# Patient Record
Sex: FEMALE | Race: WHITE | Employment: FULL TIME | URBAN - NONMETROPOLITAN AREA
[De-identification: names, ages, dates, MRNs, and addresses within clinical notes are randomized per-mention and may not be internally consistent; named-entity substitution may affect disease eponyms.]

---

## 2018-03-08 ENCOUNTER — HOSPITAL ENCOUNTER (OUTPATIENT)
Dept: ULTRASOUND IMAGING | Age: 53
Discharge: HOME | End: 2018-03-08
Payer: COMMERCIAL

## 2018-03-08 DIAGNOSIS — R11.2 NAUSEA AND VOMITING, INTRACTABILITY OF VOMITING NOT SPECIFIED, UNSPECIFIED VOMITING TYPE: ICD-10-CM

## 2018-03-08 DIAGNOSIS — R10.11 COLICKY RUQ ABDOMINAL PAIN: ICD-10-CM

## 2018-03-08 PROCEDURE — 76705 ECHO EXAM OF ABDOMEN: CPT

## 2019-10-09 ENCOUNTER — HOSPITAL ENCOUNTER (EMERGENCY)
Age: 54
Discharge: HOME | End: 2019-10-09
Attending: FAMILY MEDICINE
Payer: COMMERCIAL

## 2019-10-09 ENCOUNTER — APPOINTMENT (OUTPATIENT)
Dept: CT IMAGING | Age: 54
End: 2019-10-09
Payer: COMMERCIAL

## 2019-10-09 VITALS
TEMPERATURE: 98.7 F | RESPIRATION RATE: 18 BRPM | SYSTOLIC BLOOD PRESSURE: 118 MMHG | DIASTOLIC BLOOD PRESSURE: 81 MMHG | BODY MASS INDEX: 37.56 KG/M2 | OXYGEN SATURATION: 96 % | WEIGHT: 220 LBS | HEART RATE: 93 BPM | HEIGHT: 64 IN

## 2019-10-09 LAB
ALBUMIN SERPL-MCNC: 4 G/DL (ref 3.5–5.1)
ALP BLD-CCNC: 89 U/L (ref 38–126)
ALT SERPL-CCNC: 17 U/L (ref 11–66)
ANION GAP SERPL CALCULATED.3IONS-SCNC: 15 MEQ/L (ref 8–16)
APTT: 35.5 SECONDS (ref 22–38)
AST SERPL-CCNC: 22 U/L (ref 5–40)
BASOPHILS # BLD: 0.4 %
BASOPHILS ABSOLUTE: 0 THOU/MM3 (ref 0–0.1)
BILIRUB SERPL-MCNC: 0.3 MG/DL (ref 0.3–1.2)
BILIRUBIN DIRECT: < 0.2 MG/DL (ref 0–0.3)
BUN BLDV-MCNC: 13 MG/DL (ref 7–22)
C-REACTIVE PROTEIN: 10.79 MG/DL (ref 0–1)
CALCIUM SERPL-MCNC: 9.7 MG/DL (ref 8.5–10.5)
CHLORIDE BLD-SCNC: 100 MEQ/L (ref 98–111)
CO2: 24 MEQ/L (ref 23–33)
CREAT SERPL-MCNC: 0.7 MG/DL (ref 0.4–1.2)
EKG ATRIAL RATE: 81 BPM
EKG P AXIS: 51 DEGREES
EKG P-R INTERVAL: 172 MS
EKG Q-T INTERVAL: 372 MS
EKG QRS DURATION: 82 MS
EKG QTC CALCULATION (BAZETT): 432 MS
EKG R AXIS: 12 DEGREES
EKG T AXIS: 28 DEGREES
EKG VENTRICULAR RATE: 81 BPM
EOSINOPHIL # BLD: 1.4 %
EOSINOPHILS ABSOLUTE: 0.1 THOU/MM3 (ref 0–0.4)
ERYTHROCYTE [DISTWIDTH] IN BLOOD BY AUTOMATED COUNT: 13.8 % (ref 11.5–14.5)
ERYTHROCYTE [DISTWIDTH] IN BLOOD BY AUTOMATED COUNT: 44.8 FL (ref 35–45)
GFR SERPL CREATININE-BSD FRML MDRD: 86 ML/MIN/1.73M2
GLUCOSE BLD-MCNC: 106 MG/DL (ref 70–108)
HCT VFR BLD CALC: 41.5 % (ref 37–47)
HEMOGLOBIN: 12.8 GM/DL (ref 12–16)
IMMATURE GRANS (ABS): 0.02 THOU/MM3 (ref 0–0.07)
IMMATURE GRANULOCYTES: 0.2 %
INR BLD: 1.12 (ref 0.85–1.13)
LIPASE: 34.1 U/L (ref 5.6–51.3)
LYMPHOCYTES # BLD: 25.8 %
LYMPHOCYTES ABSOLUTE: 2.1 THOU/MM3 (ref 1–4.8)
MCH RBC QN AUTO: 27.5 PG (ref 26–33)
MCHC RBC AUTO-ENTMCNC: 30.8 GM/DL (ref 32.2–35.5)
MCV RBC AUTO: 89.2 FL (ref 81–99)
MONOCYTES # BLD: 8.1 %
MONOCYTES ABSOLUTE: 0.6 THOU/MM3 (ref 0.4–1.3)
NUCLEATED RED BLOOD CELLS: 0 /100 WBC
OSMOLALITY CALCULATION: 278.1 MOSMOL/KG (ref 275–300)
PLATELET # BLD: 314 THOU/MM3 (ref 130–400)
PMV BLD AUTO: 9.1 FL (ref 9.4–12.4)
POTASSIUM SERPL-SCNC: 4.1 MEQ/L (ref 3.5–5.2)
RBC # BLD: 4.65 MILL/MM3 (ref 4.2–5.4)
SEG NEUTROPHILS: 64.1 %
SEGMENTED NEUTROPHILS ABSOLUTE COUNT: 5.1 THOU/MM3 (ref 1.8–7.7)
SODIUM BLD-SCNC: 139 MEQ/L (ref 135–145)
TOTAL PROTEIN: 7.6 G/DL (ref 6.1–8)
TROPONIN T: < 0.01 NG/ML
WBC # BLD: 8 THOU/MM3 (ref 4.8–10.8)

## 2019-10-09 PROCEDURE — 85610 PROTHROMBIN TIME: CPT

## 2019-10-09 PROCEDURE — 99284 EMERGENCY DEPT VISIT MOD MDM: CPT

## 2019-10-09 PROCEDURE — 2580000003 HC RX 258: Performed by: FAMILY MEDICINE

## 2019-10-09 PROCEDURE — 85025 COMPLETE CBC W/AUTO DIFF WBC: CPT

## 2019-10-09 PROCEDURE — 99215 OFFICE O/P EST HI 40 MIN: CPT

## 2019-10-09 PROCEDURE — 82248 BILIRUBIN DIRECT: CPT

## 2019-10-09 PROCEDURE — 84484 ASSAY OF TROPONIN QUANT: CPT

## 2019-10-09 PROCEDURE — 99203 OFFICE O/P NEW LOW 30 MIN: CPT | Performed by: NURSE PRACTITIONER

## 2019-10-09 PROCEDURE — 83690 ASSAY OF LIPASE: CPT

## 2019-10-09 PROCEDURE — 93005 ELECTROCARDIOGRAM TRACING: CPT | Performed by: FAMILY MEDICINE

## 2019-10-09 PROCEDURE — 6360000004 HC RX CONTRAST MEDICATION: Performed by: FAMILY MEDICINE

## 2019-10-09 PROCEDURE — 86140 C-REACTIVE PROTEIN: CPT

## 2019-10-09 PROCEDURE — 74177 CT ABD & PELVIS W/CONTRAST: CPT

## 2019-10-09 PROCEDURE — 36415 COLL VENOUS BLD VENIPUNCTURE: CPT

## 2019-10-09 PROCEDURE — 85730 THROMBOPLASTIN TIME PARTIAL: CPT

## 2019-10-09 PROCEDURE — 93010 ELECTROCARDIOGRAM REPORT: CPT | Performed by: NUCLEAR MEDICINE

## 2019-10-09 PROCEDURE — 6370000000 HC RX 637 (ALT 250 FOR IP): Performed by: FAMILY MEDICINE

## 2019-10-09 PROCEDURE — 80053 COMPREHEN METABOLIC PANEL: CPT

## 2019-10-09 RX ORDER — SODIUM CHLORIDE 9 MG/ML
INJECTION, SOLUTION INTRAVENOUS CONTINUOUS
Status: DISCONTINUED | OUTPATIENT
Start: 2019-10-09 | End: 2019-10-09 | Stop reason: HOSPADM

## 2019-10-09 RX ORDER — CIPROFLOXACIN 500 MG/1
500 TABLET, FILM COATED ORAL 2 TIMES DAILY
Qty: 20 TABLET | Refills: 0 | Status: SHIPPED | OUTPATIENT
Start: 2019-10-09 | End: 2019-10-19

## 2019-10-09 RX ORDER — CIPROFLOXACIN 500 MG/1
500 TABLET, FILM COATED ORAL ONCE
Status: COMPLETED | OUTPATIENT
Start: 2019-10-09 | End: 2019-10-09

## 2019-10-09 RX ORDER — METRONIDAZOLE 500 MG/1
500 TABLET ORAL ONCE
Status: COMPLETED | OUTPATIENT
Start: 2019-10-09 | End: 2019-10-09

## 2019-10-09 RX ORDER — METRONIDAZOLE 500 MG/1
500 TABLET ORAL 3 TIMES DAILY
Qty: 30 TABLET | Refills: 0 | Status: SHIPPED | OUTPATIENT
Start: 2019-10-09 | End: 2019-10-19

## 2019-10-09 RX ADMIN — METRONIDAZOLE 500 MG: 500 TABLET, FILM COATED ORAL at 13:50

## 2019-10-09 RX ADMIN — CIPROFLOXACIN 500 MG: 500 TABLET, FILM COATED ORAL at 13:38

## 2019-10-09 RX ADMIN — SODIUM CHLORIDE: 9 INJECTION, SOLUTION INTRAVENOUS at 11:47

## 2019-10-09 RX ADMIN — IOPAMIDOL 80 ML: 755 INJECTION, SOLUTION INTRAVENOUS at 12:30

## 2019-10-09 ASSESSMENT — ENCOUNTER SYMPTOMS
COUGH: 0
ABDOMINAL PAIN: 1
SHORTNESS OF BREATH: 0
SINUS PRESSURE: 0
CONSTIPATION: 1
DIARRHEA: 1
SORE THROAT: 0
RHINORRHEA: 0
NAUSEA: 0
COLOR CHANGE: 0
VOMITING: 0

## 2019-10-09 ASSESSMENT — PAIN DESCRIPTION - PROGRESSION: CLINICAL_PROGRESSION: NOT CHANGED

## 2019-10-09 ASSESSMENT — PAIN DESCRIPTION - DESCRIPTORS: DESCRIPTORS: ACHING;PRESSURE

## 2019-10-09 ASSESSMENT — PAIN DESCRIPTION - LOCATION
LOCATION: ABDOMEN
LOCATION: ABDOMEN

## 2019-10-09 ASSESSMENT — PAIN DESCRIPTION - ONSET: ONSET: ON-GOING

## 2019-10-09 ASSESSMENT — PAIN DESCRIPTION - ORIENTATION
ORIENTATION: UPPER
ORIENTATION: RIGHT

## 2019-10-09 ASSESSMENT — PAIN DESCRIPTION - PAIN TYPE
TYPE: ACUTE PAIN
TYPE: ACUTE PAIN

## 2019-10-09 ASSESSMENT — PAIN SCALES - GENERAL
PAINLEVEL_OUTOF10: 5
PAINLEVEL_OUTOF10: 5

## 2019-10-09 ASSESSMENT — PAIN DESCRIPTION - FREQUENCY
FREQUENCY: CONTINUOUS
FREQUENCY: CONTINUOUS

## 2019-10-09 NOTE — ED PROVIDER NOTES
antibiotics. Patients   There is no immunization history on file for this patient. FAMILY HISTORY     Patient's family history includes Cancer in her father and mother. SOCIAL HISTORY     Patient  reports that she has never smoked. She has never used smokeless tobacco. She reports that she drinks alcohol. She reports that she does not use drugs. PHYSICAL EXAM     ED TRIAGE VITALS  BP: 129/63, Temp: 98.7 °F (37.1 °C), Pulse: 97, Resp: 16, SpO2: 96 %,Estimated body mass index is 37.76 kg/m² as calculated from the following:    Height as of this encounter: 5' 4\" (1.626 m). Weight as of this encounter: 220 lb (99.8 kg). ,Patient's last menstrual period was 08/11/2015. Physical Exam   Constitutional: She is oriented to person, place, and time. Vital signs are normal. She appears well-developed and well-nourished. She is active and cooperative. Non-toxic appearance. She does not have a sickly appearance. She does not appear ill. No distress. HENT:   Head: Normocephalic and atraumatic. Cardiovascular: Normal rate. Pulmonary/Chest: Effort normal.   Abdominal: Soft. Normal appearance and bowel sounds are normal. There is no hepatosplenomegaly, splenomegaly or hepatomegaly. There is tenderness. There is tenderness at McBurney's point. There is no rigidity, no rebound, no guarding, no CVA tenderness and negative Mack's sign. Neurological: She is alert and oriented to person, place, and time. Skin: Skin is warm and dry. No rash noted. Nursing note and vitals reviewed. DIAGNOSTIC RESULTS     Labs:No results found for this visit on 10/09/19. IMAGING:    No orders to display         EKG:      URGENT CARE COURSE:     Vitals:    10/09/19 0923   BP: 129/63   Pulse: 97   Resp: 16   Temp: 98.7 °F (37.1 °C)   TempSrc: Temporal   SpO2: 96%   Weight: 220 lb (99.8 kg)   Height: 5' 4\" (1.626 m)       Medications - No data to display         PROCEDURES:  None    FINAL IMPRESSION      1.  Right lower quadrant abdominal pain          DISPOSITION/ PLAN      After reviewing the patients History of Present illness, Vital Signs,Clinical Findings,Comorbities, and Clinical Data obtained I discussed with the patient or patient representative that there is a very real potential for serious injury / illness and the patient will need to be transfer to a level of higher care for further evaluation and continued care. It was explained that this would provide the best care for the patient. The patient/patient representative are agreeable to the treatment plan and are agreeable to be transferred therefore, the patient will be transferred to [facility] emergency department for reevaluation and further management . Report was called to the accepting facility and given to [facility] who accepted the patients transfer. Patient was transferred from the Urgent Care in stable condition by private car.       PATIENT REFERRED TO:  Nate Hernandez MD  [address] [place] [address] / [place] [id number]      DISCHARGE MEDICATIONS:  New Prescriptions    No medications on file       Discontinued Medications    No medications on file       Current Discharge Medication List          JOJO Whitman CNP    (Please note that portions of this note were completed with a voice recognition program. Efforts were made to edit the dictations but occasionally words are mis-transcribed.)            JOJO Whitman CNP  10/09/19 3711

## 2019-10-09 NOTE — ED TRIAGE NOTES
Pt to ER for evaluation of abdominal pain for last 5-6 days. Pt rates pain 6/10 and its infrequent and aching. Pt reports she has extensive h/o GI problems. No other concerns noted at this time. EKG obtained. Pt resting on cot, resp easy and unlabored. Call light within reach.

## 2019-10-09 NOTE — ED PROVIDER NOTES
[facility]  eMERGENCY dEPARTMENT eNCOUnter          [address] [facility]       Chief Complaint   Patient presents with    Abdominal Pain      onset 6 days hx of diverticulitis, feels the same       Nurses Notes reviewed and I agree except as noted in the HPI. HISTORY OF PRESENT ILLNESS    Guerita Moreland is a 62 y.o. female who presents to the Emergency Department for the evaluation of right lower abdominal pain onset 6 days ago but states the pain has improved some today and was the worst last night. She was sent from Urgent Care today with concern for appendicitis. She rates her current pain a 5/10. The patient also reports decreased appetite, constipation and small episodes of mucus-like diarrhea. She states that the abdominal pain has been intermittent but describes it as a pressure that is worse when bearing down to have a bowel moments or urinate. She denies any urinary symptoms but states that her urine is darker in color than normal. She reports nausea with small episodes of bile emesis, intermittent back pain and alternating hot and chills without fever. She denies any pervious abdominal surgeries but as a history of diverticulitis, GERD and a hiatal hernia. Her last colonoscopy was about 3 years ago with Dr. Ángel Lu (GI) who found colon polyps but not other abnormalities. She had her gallbladder evaluated last year which the patient reports looked ok. The patient denies any other symptoms or relevant history at this time. Location/Symptom: right lower abdominal pain   Timing/Onset: 6 days ago   Context/Setting: history of diverticulitis, GERD, hiatal hernia    Quality: pressure   Duration: intermittent   Modifying Factors: worse when bearing down to have a bowel movement or urinate   Severity: 5/10     The HPI was provided by the patient. REVIEW OF SYSTEMS     Review of Systems   Constitutional: Positive for chills. HENT: Negative for congestion.     Respiratory: Negative for shortness of breath. Cardiovascular: Negative for chest pain. Gastrointestinal: Positive for abdominal pain and constipation. Decreased appetite   Genitourinary: Negative for dysuria. Musculoskeletal: Negative for joint swelling. Skin: Negative for rash. Hematological: Negative for adenopathy. Does not bruise/bleed easily. Psychiatric/Behavioral: Negative for confusion. PAST MEDICAL HISTORY    has no past medical history on file. SURGICAL HISTORY    has a past surgical history that includes Anterior cruciate ligament repair; Colonoscopy (2016); and Upper gastrointestinal endoscopy (2016). CURRENT MEDICATIONS       Discharge Medication List as of 10/9/2019  1:50 PM      CONTINUE these medications which have NOT CHANGED    Details   PANTOPRAZOLE SODIUM PO Take 40 mg by mouth daily      Ascorbic Acid (VITAMIN C) 500 MG tablet Take 500 mg by mouth daily      Multiple Vitamin (MULTI-VITAMIN DAILY PO) Take by mouth daily             ALLERGIES     is allergic to sulfa antibiotics. FAMILY HISTORY     She indicated that her mother is . She indicated that her father is . She indicated that her son is alive. family history includes Cancer in her father and mother. SOCIAL HISTORY      reports that she has never smoked. She has never used smokeless tobacco. She reports that she drinks alcohol. She reports that she does not use drugs. PHYSICAL EXAM     INITIAL VITALS:  height is 5' 4\" (1.626 m) and weight is 220 lb (99.8 kg). Her temporal temperature is 98.7 °F (37.1 °C). Her blood pressure is 118/81 and her pulse is 93. Her respiration is 18 and oxygen saturation is 96%. Physical Exam   Constitutional: She is oriented to person, place, and time. She appears well-developed and well-nourished. Alert, GCS 15   HENT:   Head: Normocephalic and atraumatic. Eyes: Pupils are equal, round, and reactive to light. EOM are normal.   Neck: Normal range of motion. Neck supple. No JVD present. Cardiovascular: Normal rate, regular rhythm, normal heart sounds and intact distal pulses. Pulmonary/Chest: Effort normal and breath sounds normal. She has no wheezes. Abdominal: Soft. Bowel sounds are normal. She exhibits no mass. There is tenderness. There is no rebound and no guarding. Some lower abdominal tenderness up towards right mid abdomen    No rebound or guarding   Musculoskeletal: Normal range of motion. She exhibits no edema. Neurological: She is alert and oriented to person, place, and time. Skin: Skin is warm and dry. Psychiatric: She has a normal mood and affect. Her behavior is normal.   Nursing note and vitals reviewed. DIFFERENTIAL DIAGNOSIS:     Acute abdominal pain consider diverticulitis, appendicitis    Had previous work-up for gallbladder which was normal    Consider atypical cardiac ischemia    DIAGNOSTIC RESULTS     EKG: All EKG's are interpreted by the Emergency Department Physician who either signs or Co-signs this chart in the absence of a cardiologist.  EKG interpreted by Meme Mark MD:    EKG showed sinus rhythm with rate 81. QRS complexes show normal axis, normal conduction. ST-T waves show no acute change        RADIOLOGY: non-plain film images(s) such as CT, Ultrasound and MRI are read by the radiologist.    CT ABDOMEN PELVIS W IV CONTRAST Additional Contrast? None   Final Result   Acute sigmoid diverticulitis without abscess. Correlation and follow-up is advised to document complete resolution. **This report has been created using voice recognition software. It may contain minor errors which are inherent in voice recognition technology. **      Final report electronically signed by Dr. Lesia Reyes on 10/9/2019 1:05 PM           LABS:   Labs Reviewed   CBC WITH AUTO DIFFERENTIAL - Abnormal; Notable for the following components:       Result Value    MCHC 30.8 (*)     MPV 9.1 (*)     All other components within normal limits completed with a voice recognition program.  Efforts were made to edit the dictations but occasionally words are mis-transcribed.)    Scribe:  Ez Alonzo 10/9/19 11:28 AM Scribing for and in the presence of Dario Christy MD.    Signed by: Alexsandra Lund, 10/09/19 3:27 PM    Provider:  I personally performed the services described in the documentation, reviewed and edited the documentation which was dictated to the scribe in my presence, and it accurately records my words and actions.     Dario Christy MD 10/9/19 3:27 PM       Dario Christy MD  10/09/19 1 [facility], [place]  10/09/19 0683

## 2021-10-27 ENCOUNTER — HOSPITAL ENCOUNTER (OUTPATIENT)
Age: 56
Discharge: HOME | End: 2021-10-27
Payer: COMMERCIAL

## 2021-10-27 LAB
ANION GAP SERPL CALCULATED.3IONS-SCNC: 11 MEQ/L (ref 8–16)
BUN BLDV-MCNC: 15 MG/DL (ref 7–22)
CALCIUM SERPL-MCNC: 9.6 MG/DL (ref 8.5–10.5)
CHLORIDE BLD-SCNC: 100 MEQ/L (ref 98–111)
CO2: 27 MEQ/L (ref 23–33)
CREAT SERPL-MCNC: 0.7 MG/DL (ref 0.4–1.2)
EKG ATRIAL RATE: 85 BPM
EKG P AXIS: 40 DEGREES
EKG P-R INTERVAL: 174 MS
EKG Q-T INTERVAL: 360 MS
EKG QRS DURATION: 64 MS
EKG QTC CALCULATION (BAZETT): 428 MS
EKG R AXIS: 14 DEGREES
EKG T AXIS: 22 DEGREES
EKG VENTRICULAR RATE: 85 BPM
ERYTHROCYTE [DISTWIDTH] IN BLOOD BY AUTOMATED COUNT: 14.3 % (ref 11.5–14.5)
ERYTHROCYTE [DISTWIDTH] IN BLOOD BY AUTOMATED COUNT: 46.5 FL (ref 35–45)
GFR SERPL CREATININE-BSD FRML MDRD: 86 ML/MIN/1.73M2
GLUCOSE BLD-MCNC: 92 MG/DL (ref 70–108)
HCT VFR BLD CALC: 42.3 % (ref 37–47)
HEMOGLOBIN: 12.9 GM/DL (ref 12–16)
MCH RBC QN AUTO: 27.3 PG (ref 26–33)
MCHC RBC AUTO-ENTMCNC: 30.5 GM/DL (ref 32.2–35.5)
MCV RBC AUTO: 89.6 FL (ref 81–99)
PLATELET # BLD: 344 THOU/MM3 (ref 130–400)
PMV BLD AUTO: 9.4 FL (ref 9.4–12.4)
POTASSIUM SERPL-SCNC: 4.6 MEQ/L (ref 3.5–5.2)
RBC # BLD: 4.72 MILL/MM3 (ref 4.2–5.4)
SODIUM BLD-SCNC: 138 MEQ/L (ref 135–145)
WBC # BLD: 8.5 THOU/MM3 (ref 4.8–10.8)

## 2021-10-27 PROCEDURE — 93005 ELECTROCARDIOGRAM TRACING: CPT | Performed by: ORTHOPAEDIC SURGERY

## 2021-10-27 PROCEDURE — 80048 BASIC METABOLIC PNL TOTAL CA: CPT

## 2021-10-27 PROCEDURE — 36415 COLL VENOUS BLD VENIPUNCTURE: CPT

## 2021-10-27 PROCEDURE — 85027 COMPLETE CBC AUTOMATED: CPT

## 2021-10-28 ENCOUNTER — APPOINTMENT (OUTPATIENT)
Dept: GENERAL RADIOLOGY | Age: 56
End: 2021-10-28
Attending: ORTHOPAEDIC SURGERY
Payer: COMMERCIAL

## 2021-10-28 ENCOUNTER — ANESTHESIA (OUTPATIENT)
Dept: OPERATING ROOM | Age: 56
End: 2021-10-28
Payer: COMMERCIAL

## 2021-10-28 ENCOUNTER — HOSPITAL ENCOUNTER (OUTPATIENT)
Age: 56
Setting detail: OUTPATIENT SURGERY
Discharge: HOME | End: 2021-10-28
Attending: ORTHOPAEDIC SURGERY | Admitting: ORTHOPAEDIC SURGERY
Payer: COMMERCIAL

## 2021-10-28 ENCOUNTER — ANESTHESIA EVENT (OUTPATIENT)
Dept: OPERATING ROOM | Age: 56
End: 2021-10-28
Payer: COMMERCIAL

## 2021-10-28 VITALS
OXYGEN SATURATION: 93 % | HEIGHT: 65 IN | DIASTOLIC BLOOD PRESSURE: 69 MMHG | RESPIRATION RATE: 16 BRPM | BODY MASS INDEX: 38.25 KG/M2 | WEIGHT: 229.6 LBS | SYSTOLIC BLOOD PRESSURE: 119 MMHG | TEMPERATURE: 97.4 F | HEART RATE: 103 BPM

## 2021-10-28 VITALS — SYSTOLIC BLOOD PRESSURE: 82 MMHG | OXYGEN SATURATION: 99 % | DIASTOLIC BLOOD PRESSURE: 51 MMHG

## 2021-10-28 DIAGNOSIS — Z98.890 S/P ORIF (OPEN REDUCTION INTERNAL FIXATION) FRACTURE: Primary | ICD-10-CM

## 2021-10-28 DIAGNOSIS — Z87.81 S/P ORIF (OPEN REDUCTION INTERNAL FIXATION) FRACTURE: Primary | ICD-10-CM

## 2021-10-28 PROCEDURE — 2720000010 HC SURG SUPPLY STERILE: Performed by: ORTHOPAEDIC SURGERY

## 2021-10-28 PROCEDURE — 2709999900 HC NON-CHARGEABLE SUPPLY: Performed by: ORTHOPAEDIC SURGERY

## 2021-10-28 PROCEDURE — 2580000003 HC RX 258: Performed by: ORTHOPAEDIC SURGERY

## 2021-10-28 PROCEDURE — C1713 ANCHOR/SCREW BN/BN,TIS/BN: HCPCS | Performed by: ORTHOPAEDIC SURGERY

## 2021-10-28 PROCEDURE — 7100000011 HC PHASE II RECOVERY - ADDTL 15 MIN: Performed by: ORTHOPAEDIC SURGERY

## 2021-10-28 PROCEDURE — 6360000002 HC RX W HCPCS: Performed by: ANESTHESIOLOGY

## 2021-10-28 PROCEDURE — 3700000001 HC ADD 15 MINUTES (ANESTHESIA): Performed by: ORTHOPAEDIC SURGERY

## 2021-10-28 PROCEDURE — 7100000000 HC PACU RECOVERY - FIRST 15 MIN: Performed by: ORTHOPAEDIC SURGERY

## 2021-10-28 PROCEDURE — C1776 JOINT DEVICE (IMPLANTABLE): HCPCS | Performed by: ORTHOPAEDIC SURGERY

## 2021-10-28 PROCEDURE — 2500000003 HC RX 250 WO HCPCS: Performed by: REGISTERED NURSE

## 2021-10-28 PROCEDURE — 6360000002 HC RX W HCPCS: Performed by: ORTHOPAEDIC SURGERY

## 2021-10-28 PROCEDURE — 6360000002 HC RX W HCPCS: Performed by: REGISTERED NURSE

## 2021-10-28 PROCEDURE — 3600000014 HC SURGERY LEVEL 4 ADDTL 15MIN: Performed by: ORTHOPAEDIC SURGERY

## 2021-10-28 PROCEDURE — 73060 X-RAY EXAM OF HUMERUS: CPT

## 2021-10-28 PROCEDURE — 7100000010 HC PHASE II RECOVERY - FIRST 15 MIN: Performed by: ORTHOPAEDIC SURGERY

## 2021-10-28 PROCEDURE — 6370000000 HC RX 637 (ALT 250 FOR IP)

## 2021-10-28 PROCEDURE — 3700000000 HC ANESTHESIA ATTENDED CARE: Performed by: ORTHOPAEDIC SURGERY

## 2021-10-28 PROCEDURE — 3209999900 FLUORO FOR SURGICAL PROCEDURES

## 2021-10-28 PROCEDURE — 3600000004 HC SURGERY LEVEL 4 BASE: Performed by: ORTHOPAEDIC SURGERY

## 2021-10-28 PROCEDURE — 7100000001 HC PACU RECOVERY - ADDTL 15 MIN: Performed by: ORTHOPAEDIC SURGERY

## 2021-10-28 PROCEDURE — 2500000003 HC RX 250 WO HCPCS: Performed by: ORTHOPAEDIC SURGERY

## 2021-10-28 DEVICE — EVOS 3.5MM X 22MM CORTEX SCREW SELF-TAPPING
Type: IMPLANTABLE DEVICE | Site: ARM | Status: FUNCTIONAL
Brand: EVOS

## 2021-10-28 DEVICE — EVOS 3.5MM X 40MM LOCKING SCREW SELF-TAPPING
Type: IMPLANTABLE DEVICE | Site: ARM | Status: FUNCTIONAL
Brand: EVOS

## 2021-10-28 DEVICE — EVOS 3.5MM X 36MM CORTEX SCREW SELF-TAPPING
Type: IMPLANTABLE DEVICE | Site: ARM | Status: FUNCTIONAL
Brand: EVOS

## 2021-10-28 DEVICE — EVOS 3.5MM X 46MM LOCKING SCREW SELF-TAPPING
Type: IMPLANTABLE DEVICE | Site: ARM | Status: FUNCTIONAL
Brand: EVOS

## 2021-10-28 DEVICE — EVOS 3.5MM STRAIGHT PROXIMAL                                    HUMERUS PLATE 3 HOLE 92MM
Type: IMPLANTABLE DEVICE | Site: ARM | Status: FUNCTIONAL
Brand: EVOS

## 2021-10-28 DEVICE — EVOS 3.5MM X 42MM LOCKING SCREW SELF-TAPPING
Type: IMPLANTABLE DEVICE | Site: ARM | Status: FUNCTIONAL
Brand: EVOS

## 2021-10-28 RX ORDER — CEFAZOLIN SODIUM 2 G/100ML
2000 INJECTION, SOLUTION INTRAVENOUS
Status: COMPLETED | OUTPATIENT
Start: 2021-10-28 | End: 2021-10-28

## 2021-10-28 RX ORDER — SODIUM CHLORIDE 0.9 % (FLUSH) 0.9 %
5-40 SYRINGE (ML) INJECTION EVERY 12 HOURS SCHEDULED
Status: DISCONTINUED | OUTPATIENT
Start: 2021-10-28 | End: 2021-10-28 | Stop reason: HOSPADM

## 2021-10-28 RX ORDER — FENTANYL CITRATE 50 UG/ML
25 INJECTION, SOLUTION INTRAMUSCULAR; INTRAVENOUS EVERY 5 MIN PRN
Status: DISCONTINUED | OUTPATIENT
Start: 2021-10-28 | End: 2021-10-28 | Stop reason: HOSPADM

## 2021-10-28 RX ORDER — MORPHINE SULFATE 2 MG/ML
2 INJECTION, SOLUTION INTRAMUSCULAR; INTRAVENOUS
Status: DISCONTINUED | OUTPATIENT
Start: 2021-10-28 | End: 2021-10-28 | Stop reason: HOSPADM

## 2021-10-28 RX ORDER — SODIUM CHLORIDE 0.9 % (FLUSH) 0.9 %
5-40 SYRINGE (ML) INJECTION PRN
Status: DISCONTINUED | OUTPATIENT
Start: 2021-10-28 | End: 2021-10-28 | Stop reason: HOSPADM

## 2021-10-28 RX ORDER — SUCCINYLCHOLINE CHLORIDE 20 MG/ML
INJECTION INTRAMUSCULAR; INTRAVENOUS PRN
Status: DISCONTINUED | OUTPATIENT
Start: 2021-10-28 | End: 2021-10-28 | Stop reason: SDUPTHER

## 2021-10-28 RX ORDER — ONDANSETRON 2 MG/ML
INJECTION INTRAMUSCULAR; INTRAVENOUS PRN
Status: DISCONTINUED | OUTPATIENT
Start: 2021-10-28 | End: 2021-10-28 | Stop reason: SDUPTHER

## 2021-10-28 RX ORDER — ONDANSETRON 2 MG/ML
4 INJECTION INTRAMUSCULAR; INTRAVENOUS EVERY 6 HOURS PRN
Status: DISCONTINUED | OUTPATIENT
Start: 2021-10-28 | End: 2021-10-28 | Stop reason: HOSPADM

## 2021-10-28 RX ORDER — CYCLOBENZAPRINE HCL 10 MG
TABLET ORAL
Status: COMPLETED
Start: 2021-10-28 | End: 2021-10-28

## 2021-10-28 RX ORDER — MEPERIDINE HYDROCHLORIDE 25 MG/ML
12.5 INJECTION INTRAMUSCULAR; INTRAVENOUS; SUBCUTANEOUS EVERY 5 MIN PRN
Status: DISCONTINUED | OUTPATIENT
Start: 2021-10-28 | End: 2021-10-28 | Stop reason: HOSPADM

## 2021-10-28 RX ORDER — FENTANYL CITRATE 50 UG/ML
50 INJECTION, SOLUTION INTRAMUSCULAR; INTRAVENOUS EVERY 5 MIN PRN
Status: DISCONTINUED | OUTPATIENT
Start: 2021-10-28 | End: 2021-10-28 | Stop reason: HOSPADM

## 2021-10-28 RX ORDER — FENTANYL CITRATE 50 UG/ML
INJECTION, SOLUTION INTRAMUSCULAR; INTRAVENOUS PRN
Status: DISCONTINUED | OUTPATIENT
Start: 2021-10-28 | End: 2021-10-28 | Stop reason: SDUPTHER

## 2021-10-28 RX ORDER — SODIUM CHLORIDE 9 MG/ML
INJECTION, SOLUTION INTRAVENOUS CONTINUOUS
Status: DISCONTINUED | OUTPATIENT
Start: 2021-10-28 | End: 2021-10-28 | Stop reason: HOSPADM

## 2021-10-28 RX ORDER — HYDROCODONE BITARTRATE AND ACETAMINOPHEN 5; 325 MG/1; MG/1
2 TABLET ORAL EVERY 4 HOURS PRN
Status: DISCONTINUED | OUTPATIENT
Start: 2021-10-28 | End: 2021-10-28 | Stop reason: HOSPADM

## 2021-10-28 RX ORDER — CYCLOBENZAPRINE HCL 10 MG
10 TABLET ORAL ONCE
Status: COMPLETED | OUTPATIENT
Start: 2021-10-28 | End: 2021-10-28

## 2021-10-28 RX ORDER — BUPIVACAINE HYDROCHLORIDE 5 MG/ML
INJECTION, SOLUTION PERINEURAL PRN
Status: DISCONTINUED | OUTPATIENT
Start: 2021-10-28 | End: 2021-10-28 | Stop reason: ALTCHOICE

## 2021-10-28 RX ORDER — LABETALOL 20 MG/4 ML (5 MG/ML) INTRAVENOUS SYRINGE
5 EVERY 10 MIN PRN
Status: DISCONTINUED | OUTPATIENT
Start: 2021-10-28 | End: 2021-10-28 | Stop reason: HOSPADM

## 2021-10-28 RX ORDER — MULTIVIT-MIN/IRON/FOLIC ACID/K 18-600-40
1 CAPSULE ORAL DAILY
COMMUNITY

## 2021-10-28 RX ORDER — MORPHINE SULFATE 2 MG/ML
4 INJECTION, SOLUTION INTRAMUSCULAR; INTRAVENOUS
Status: DISCONTINUED | OUTPATIENT
Start: 2021-10-28 | End: 2021-10-28 | Stop reason: HOSPADM

## 2021-10-28 RX ORDER — PROMETHAZINE HYDROCHLORIDE 25 MG/ML
6.25 INJECTION, SOLUTION INTRAMUSCULAR; INTRAVENOUS
Status: DISCONTINUED | OUTPATIENT
Start: 2021-10-28 | End: 2021-10-28 | Stop reason: HOSPADM

## 2021-10-28 RX ORDER — HYDROCODONE BITARTRATE AND ACETAMINOPHEN 5; 325 MG/1; MG/1
1 TABLET ORAL EVERY 4 HOURS PRN
Status: DISCONTINUED | OUTPATIENT
Start: 2021-10-28 | End: 2021-10-28 | Stop reason: HOSPADM

## 2021-10-28 RX ORDER — BACTERIOSTATIC SODIUM CHLORIDE 0.9 %
VIAL (ML) INJECTION PRN
Status: DISCONTINUED | OUTPATIENT
Start: 2021-10-28 | End: 2021-10-28 | Stop reason: ALTCHOICE

## 2021-10-28 RX ORDER — HYDROMORPHONE HCL 110MG/55ML
PATIENT CONTROLLED ANALGESIA SYRINGE INTRAVENOUS PRN
Status: DISCONTINUED | OUTPATIENT
Start: 2021-10-28 | End: 2021-10-28 | Stop reason: SDUPTHER

## 2021-10-28 RX ORDER — KETOROLAC TROMETHAMINE 30 MG/ML
INJECTION, SOLUTION INTRAMUSCULAR; INTRAVENOUS PRN
Status: DISCONTINUED | OUTPATIENT
Start: 2021-10-28 | End: 2021-10-28 | Stop reason: ALTCHOICE

## 2021-10-28 RX ORDER — DEXAMETHASONE SODIUM PHOSPHATE 10 MG/ML
INJECTION, EMULSION INTRAMUSCULAR; INTRAVENOUS PRN
Status: DISCONTINUED | OUTPATIENT
Start: 2021-10-28 | End: 2021-10-28 | Stop reason: SDUPTHER

## 2021-10-28 RX ORDER — MORPHINE SULFATE 10 MG/ML
INJECTION, SOLUTION INTRAMUSCULAR; INTRAVENOUS PRN
Status: DISCONTINUED | OUTPATIENT
Start: 2021-10-28 | End: 2021-10-28 | Stop reason: ALTCHOICE

## 2021-10-28 RX ORDER — TRANEXAMIC ACID 100 MG/ML
INJECTION, SOLUTION INTRAVENOUS PRN
Status: DISCONTINUED | OUTPATIENT
Start: 2021-10-28 | End: 2021-10-28 | Stop reason: SDUPTHER

## 2021-10-28 RX ORDER — ONDANSETRON 2 MG/ML
4 INJECTION INTRAMUSCULAR; INTRAVENOUS
Status: DISCONTINUED | OUTPATIENT
Start: 2021-10-28 | End: 2021-10-28 | Stop reason: HOSPADM

## 2021-10-28 RX ORDER — HYDROCODONE BITARTRATE AND ACETAMINOPHEN 5; 325 MG/1; MG/1
1 TABLET ORAL EVERY 4 HOURS PRN
Status: ON HOLD | COMMUNITY
End: 2021-10-28 | Stop reason: HOSPADM

## 2021-10-28 RX ORDER — PROPOFOL 10 MG/ML
INJECTION, EMULSION INTRAVENOUS PRN
Status: DISCONTINUED | OUTPATIENT
Start: 2021-10-28 | End: 2021-10-28 | Stop reason: SDUPTHER

## 2021-10-28 RX ORDER — SODIUM CHLORIDE 9 MG/ML
25 INJECTION, SOLUTION INTRAVENOUS PRN
Status: DISCONTINUED | OUTPATIENT
Start: 2021-10-28 | End: 2021-10-28 | Stop reason: HOSPADM

## 2021-10-28 RX ORDER — CYCLOBENZAPRINE HCL 10 MG
10 TABLET ORAL 3 TIMES DAILY PRN
Qty: 40 TABLET | Refills: 0 | Status: SHIPPED | OUTPATIENT
Start: 2021-10-28 | End: 2021-11-07

## 2021-10-28 RX ORDER — HYDROCODONE BITARTRATE AND ACETAMINOPHEN 5; 325 MG/1; MG/1
1-2 TABLET ORAL
Qty: 42 TABLET | Refills: 0 | Status: SHIPPED | OUTPATIENT
Start: 2021-10-28 | End: 2021-11-04

## 2021-10-28 RX ADMIN — HYDROMORPHONE HYDROCHLORIDE 1 MG: 2 INJECTION INTRAMUSCULAR; INTRAVENOUS; SUBCUTANEOUS at 10:05

## 2021-10-28 RX ADMIN — DEXAMETHASONE SODIUM PHOSPHATE 10 MG: 10 INJECTION, EMULSION INTRAMUSCULAR; INTRAVENOUS at 08:46

## 2021-10-28 RX ADMIN — Medication 10 MG: at 10:40

## 2021-10-28 RX ADMIN — FENTANYL CITRATE 50 MCG: 50 INJECTION INTRAMUSCULAR; INTRAVENOUS at 10:40

## 2021-10-28 RX ADMIN — HYDROMORPHONE HYDROCHLORIDE 0.5 MG: 2 INJECTION INTRAMUSCULAR; INTRAVENOUS; SUBCUTANEOUS at 08:54

## 2021-10-28 RX ADMIN — SODIUM CHLORIDE: 9 INJECTION, SOLUTION INTRAVENOUS at 07:59

## 2021-10-28 RX ADMIN — SUCCINYLCHOLINE CHLORIDE 180 MG: 20 INJECTION, SOLUTION INTRAMUSCULAR; INTRAVENOUS at 08:46

## 2021-10-28 RX ADMIN — PROPOFOL 200 MG: 10 INJECTION, EMULSION INTRAVENOUS at 08:46

## 2021-10-28 RX ADMIN — CEFAZOLIN SODIUM 2000 MG: 2 INJECTION, SOLUTION INTRAVENOUS at 08:55

## 2021-10-28 RX ADMIN — CYCLOBENZAPRINE 10 MG: 10 TABLET, FILM COATED ORAL at 10:40

## 2021-10-28 RX ADMIN — TRANEXAMIC ACID 1000 MG: 1 INJECTION, SOLUTION INTRAVENOUS at 09:01

## 2021-10-28 RX ADMIN — FENTANYL CITRATE 50 MCG: 50 INJECTION INTRAMUSCULAR; INTRAVENOUS at 10:55

## 2021-10-28 RX ADMIN — ONDANSETRON HYDROCHLORIDE 4 MG: 4 INJECTION, SOLUTION INTRAMUSCULAR; INTRAVENOUS at 08:46

## 2021-10-28 RX ADMIN — FENTANYL CITRATE 100 MCG: 50 INJECTION, SOLUTION INTRAMUSCULAR; INTRAVENOUS at 08:46

## 2021-10-28 RX ADMIN — HYDROMORPHONE HYDROCHLORIDE 0.5 MG: 2 INJECTION INTRAMUSCULAR; INTRAVENOUS; SUBCUTANEOUS at 09:07

## 2021-10-28 ASSESSMENT — PULMONARY FUNCTION TESTS
PIF_VALUE: 11
PIF_VALUE: 13
PIF_VALUE: 16
PIF_VALUE: 14
PIF_VALUE: 1
PIF_VALUE: 14
PIF_VALUE: 6
PIF_VALUE: 13
PIF_VALUE: 14
PIF_VALUE: 14
PIF_VALUE: 13
PIF_VALUE: 13
PIF_VALUE: 0
PIF_VALUE: 13
PIF_VALUE: 14
PIF_VALUE: 0
PIF_VALUE: 10
PIF_VALUE: 11
PIF_VALUE: 13
PIF_VALUE: 14
PIF_VALUE: 14
PIF_VALUE: 25
PIF_VALUE: 13
PIF_VALUE: 14
PIF_VALUE: 24
PIF_VALUE: 1
PIF_VALUE: 11
PIF_VALUE: 13
PIF_VALUE: 14
PIF_VALUE: 13
PIF_VALUE: 24
PIF_VALUE: 0
PIF_VALUE: 14
PIF_VALUE: 13
PIF_VALUE: 14
PIF_VALUE: 16
PIF_VALUE: 10
PIF_VALUE: 16
PIF_VALUE: 14
PIF_VALUE: 14
PIF_VALUE: 24
PIF_VALUE: 6
PIF_VALUE: 15
PIF_VALUE: 14
PIF_VALUE: 14
PIF_VALUE: 16
PIF_VALUE: 14
PIF_VALUE: 13
PIF_VALUE: 13
PIF_VALUE: 14
PIF_VALUE: 11
PIF_VALUE: 13
PIF_VALUE: 16
PIF_VALUE: 13
PIF_VALUE: 14
PIF_VALUE: 13
PIF_VALUE: 14
PIF_VALUE: 13
PIF_VALUE: 6
PIF_VALUE: 11
PIF_VALUE: 14
PIF_VALUE: 14
PIF_VALUE: 6
PIF_VALUE: 13
PIF_VALUE: 17
PIF_VALUE: 11
PIF_VALUE: 11
PIF_VALUE: 16
PIF_VALUE: 14
PIF_VALUE: 0
PIF_VALUE: 14
PIF_VALUE: 0
PIF_VALUE: 14
PIF_VALUE: 1
PIF_VALUE: 16
PIF_VALUE: 14
PIF_VALUE: 13
PIF_VALUE: 14
PIF_VALUE: 16

## 2021-10-28 ASSESSMENT — PAIN - FUNCTIONAL ASSESSMENT: PAIN_FUNCTIONAL_ASSESSMENT: 0-10

## 2021-10-28 ASSESSMENT — PAIN SCALES - GENERAL
PAINLEVEL_OUTOF10: 8
PAINLEVEL_OUTOF10: 8
PAINLEVEL_OUTOF10: 6
PAINLEVEL_OUTOF10: 8

## 2021-10-28 NOTE — PROGRESS NOTES
Oriented to sds 4          Family updated to stay in room. Informed family to take belonging with them if they leave. Keep nothing of value in room unattended. pt was asked and agreed to first name and last initial being put on white boards. Allergy and fall risks applied. SCD Applied to patient. Warming blanket applied to patient. Pt denies any abuse or thoughts of suicide.

## 2021-10-28 NOTE — FLOWSHEET NOTE
Pt returned to [facility] room 4. Vitals and assessment as charted. 0.9 infusing, @800ml to count from PACU. Pt has pudding and juice. Family at the bedside. Pt and family verbalized understanding of discharge criteria and call light use. Call light in reach.

## 2021-10-28 NOTE — PROGRESS NOTES
1014  Pt. Unresponsive on adm. To pacu. Left shoulder dressing intact and dry. Sling intact to left arm and left arm elevated. Ice applied to shoulder. 1020  Pt. Awake and oriented. o2 discontinued. 1030  Pt. Complains of left arm pain and spasms. 1040  Pt. Medicated for pain. 1055  Pt. States pain is easing but left arm continues to spasm. Continue to medicate. 1100  Pt. Taking ice chips without difficulty. 1120  pacu criteria met. Transfer to [facility].

## 2021-10-28 NOTE — PROGRESS NOTES

## 2021-10-28 NOTE — ANESTHESIA POSTPROCEDURE EVALUATION
Department of Anesthesiology  Postprocedure Note    Patient: Corrie Smyth  MRN: [id number]  YOB: 1965  Date of evaluation: 10/28/2021  Time:  11:01 AM     Procedure Summary     Date: 10/28/21 Room / Location: [address] RADU [place]    Anesthesia Start: 0840 Anesthesia Stop: 2228    Procedure: ORIF LEFT PROXIMAL HUMERUS (Left Arm Upper) Diagnosis: (LEFT PROXIMAL HUMERUR FRACTURE)    Surgeons: Venkatesh Young MD Responsible Provider: Chino Sosa MD    Anesthesia Type: general ASA Status: 2          Anesthesia Type: general    Aad Phase I: Ada Score: 4    Ada Phase II:      Last vitals: Reviewed and per EMR flowsheets.        Anesthesia Post Evaluation    Patient location during evaluation: PACU  Patient participation: complete - patient participated  Level of consciousness: awake and alert  Airway patency: patent  Nausea & Vomiting: no nausea  Complications: no  Cardiovascular status: blood pressure returned to baseline and hemodynamically stable  Respiratory status: acceptable and spontaneous ventilation  Hydration status: euvolemic

## 2021-10-28 NOTE — H&P
History and Physical Update    Pt Name: Milton Estrada  MRN: [id number]  YOB: 1965  Date of evaluation: 10/28/2021    [x] I have examined the patient and reviewed the H&P by Tristin Fleming PA-C in office on 10/27/2021 and there are no changes to the patient nor the plan at this time.     [] I have examined the patient and reviewed the H&P/Consult and have noted the following changes:        Chantel Jonas PA-C  Electronically signed 10/28/2021 at 7:21 AM

## 2021-10-28 NOTE — ANESTHESIA PRE PROCEDURE
Department of Anesthesiology  Preprocedure Note       Name:  China Craig   Age:  60 y.o.  :  1965                                          MRN:  [id number]         Date:  10/28/2021      Surgeon: Dawna Dent):  Jh Manning MD    Procedure: Procedure(s):  ORIF LEFT PROXIMAL HUMERUS    Medications prior to admission:   Prior to Admission medications    Medication Sig Start Date End Date Taking? Authorizing Provider   PANTOPRAZOLE SODIUM PO Take 40 mg by mouth daily    Historical Provider, MD   Ascorbic Acid (VITAMIN C) 500 MG tablet Take 500 mg by mouth daily    Historical Provider, MD   Multiple Vitamin (MULTI-VITAMIN DAILY PO) Take by mouth daily    Historical Provider, MD       Current medications:    No current facility-administered medications for this encounter. Allergies: Allergies   Allergen Reactions    Sulfa Antibiotics Rash       Problem List:    Patient Active Problem List   Diagnosis Code    Hematemesis K92.0    Diverticulitis of large intestine K57.32    Generalized abdominal pain R10.84    GERD (gastroesophageal reflux disease) K21.9    FH: colonic polyps Z83.71       Past Medical History:  History reviewed. No pertinent past medical history. Past Surgical History:        Procedure Laterality Date    ANTERIOR CRUCIATE LIGAMENT REPAIR      COLONOSCOPY  2016    UPPER GASTROINTESTINAL ENDOSCOPY  2016       Social History:    Social History     Tobacco Use    Smoking status: Never Smoker    Smokeless tobacco: Never Used   Substance Use Topics    Alcohol use: Yes     Comment: Social                                Counseling given: Not Answered      Vital Signs (Current): There were no vitals filed for this visit.                                            BP Readings from Last 3 Encounters:   10/09/19 118/81   16 126/82   16 113/75       NPO Status:                                                                                 BMI:   Wt Readings from Last 3 Encounters:   10/09/19 220 lb (99.8 kg)   05/12/16 218 lb (98.9 kg)   05/12/16 218 lb (98.9 kg)     There is no height or weight on file to calculate BMI.    CBC:   Lab Results   Component Value Date    WBC 8.5 10/27/2021    RBC 4.72 10/27/2021    RBC 5.09 02/14/2012    HGB 12.9 10/27/2021    HCT 42.3 10/27/2021    MCV 89.6 10/27/2021    RDW 15.1 04/04/2016     10/27/2021       CMP:   Lab Results   Component Value Date     10/27/2021    K 4.6 10/27/2021     10/27/2021    CO2 27 10/27/2021    BUN 15 10/27/2021    CREATININE 0.7 10/27/2021    LABGLOM 86 10/27/2021    GLUCOSE 92 10/27/2021    GLUCOSE 92 02/14/2012    PROT 7.6 10/09/2019    CALCIUM 9.6 10/27/2021    BILITOT 0.3 10/09/2019    ALKPHOS 89 10/09/2019    AST 22 10/09/2019    ALT 17 10/09/2019       POC Tests: No results for input(s): POCGLU, POCNA, POCK, POCCL, POCBUN, POCHEMO, POCHCT in the last 72 hours. Coags:   Lab Results   Component Value Date    INR 1.12 10/09/2019    APTT 35.5 10/09/2019       HCG (If Applicable):   Lab Results   Component Value Date    PREGSERUM NEGATIVE 04/04/2016        ABGs: No results found for: PHART, PO2ART, EGS3ORA, ADE1MIC, BEART, O4KUMGCH     Type & Screen (If Applicable):  No results found for: LABABO, LABRH    Drug/Infectious Status (If Applicable):  No results found for: HIV, HEPCAB    COVID-19 Screening (If Applicable): No results found for: COVID19        Anesthesia Evaluation   no history of anesthetic complications:   Airway: Mallampati: I  TM distance: >3 FB   Neck ROM: full  Mouth opening: > = 3 FB Dental:          Pulmonary:Negative Pulmonary ROS and normal exam              Patient did not smoke on day of surgery.                  Cardiovascular:  Exercise tolerance: good (>4 METS),                     Neuro/Psych:   Negative Neuro/Psych ROS              GI/Hepatic/Renal:   (+) GERD: well controlled,           Endo/Other: Negative Endo/Other ROS             Pt had no PAT visit Abdominal:   (+) obese,           Vascular: negative vascular ROS. Other Findings:             Anesthesia Plan      general     ASA 2       Induction: intravenous. MIPS: Postoperative opioids intended and Prophylactic antiemetics administered. Anesthetic plan and risks discussed with patient. Plan discussed with CRNA.                   Ngoc Donohue MD   10/28/2021

## 2021-10-28 NOTE — OP NOTE
Operative Note      Patient: China Craig  YOB: 1965  MRN: [id number]    Date of Procedure: 10/28/2021    Pre-Op Diagnosis: LEFT PROXIMAL HUMERUR FRACTURE 2 part closed displaced    Post-Op Diagnosis: Same       Procedure(s):  ORIF LEFT PROXIMAL HUMERUS    Surgeon(s):  Jh Manning MD    Assistant:   Physician Assistant: Christoph Astudillo PA-C; Lucretia Haskins PA-C; JACINTA Simmons    Anesthesia: General    Estimated Blood Loss (mL): 927     Complications: None    Specimens:   * No specimens in log *    Implants:  Implant Name Type Inv. Item Serial No.  Lot No. LRB No. Used Action   PLATE BONE [id number] 3 H STRL PROX HUM S STL STR FOR 3.5MM SCR  PLATE BONE [id number] 3 H STRL PROX HUM S STL STR FOR 3.5MM SCR  [facility]  Left 1 Implanted   SCREW BONE L20MM DIA3. 5MM STRL ERIC S STL ST FOR SM PLATING  SCREW BONE L20MM DIA3. 5MM STRL ERIC S STL ST FOR SM PLATING  [facility]  Left 1 Implanted   SCREW BONE L36MM DIA3.5MM ERIC S STL ST FOR SM PLATING SYS  SCREW BONE L36MM DIA3.5MM ERIC S STL ST FOR SM PLATING SYS  [facility]  Left 1 Implanted   SCREW BONE L22MM DIA3. 5MM STRL ERIC S STL ST FOR SM PLATING  SCREW BONE L22MM DIA3. 5MM STRL ERIC S STL ST FOR SM PLATING  [facility]  Left 2 Implanted   SCREW BONE L42MM DIA3. 5MM STRL S STL LCK ST FOR SM PLATING  SCREW BONE L42MM DIA3. 5MM STRL S STL LCK ST FOR SM PLATING  [facility]  Left 1 Implanted   SCREW BONE L36MM DIA3. 5MM STRL S STL LCK ST FOR SM PLATING  SCREW BONE L36MM DIA3. 5MM STRL S STL LCK ST FOR SM PLATING  [facility]  Left 1 Implanted   SCREW BONE L40MM DIA3. 5MM S STL ST LCK FOR EVOS SM PLATING  SCREW BONE L40MM DIA3. 5MM S STL ST LCK FOR EVOS SM PLATING  [facility]  Left 2 Implanted   SCREW BONE L46MM DIA3. 5MM STRL S STL LCK ST FOR SM PLATING  SCREW BONE L46MM DIA3. 5MM STRL S STL LCK ST FOR SM PLATING  [employer]-  Left 2 Implanted         Drains: * No LDAs found *    Findings: Confirmed    Detailed Description of Procedure: Indications  This is a 57-year-old female with an injury to her left shoulder. She presented the office yesterday with a left proximal humerus fracture closed displaced 2 part. It was significantly displaced. Felt she would benefit from op intervention the early mobilization as well as fracture reduction maintenance. Patient agreed. Narrative  Patient taken the operating room underwent a general anesthetic. Timeout was taken consent was confirmed. She was placed in the captain's chair position. Underwent a standard prepping and draping. He received 2 g of Ancef as well as 1 g of TXA. Started with lateral approach the deltoid skin knife electrocautery down to the deltoid. Took a small sleeve of the deltoid off the acromion. The deltoid was then split as well. Nerve was identified. The shaft component was embedded in the muscle that was taken down. We then slid that back in the position got a reasonable alignment. There are some comminution noted at the fracture site. We took a 3 hole [employer] proximal humeral locking plate and slid that into position. 1 nonlocking screw in the shaft followed by 1 in the proximal humerus to compress the plate to bone a series of locking screws were placed proximally and a series of nonlocking in the shaft. X-rays demonstrate fracture be in satisfactory limit hardware in satisfactory position. Wounds irrigated. Injected local anesthetic. Using Vicryl suture to close the deep tissues followed by 2-0 Vicryl 3-0 nylon dry dressings. Patient awakened turned to cover room good condition. Postoperative plan  Nonweightbearing outside. Dry dressings as necessary. Is here in the office in 2 to 3-week suture removal x-rays 2 views left shoulder.     Electronically signed by William Barlow MD on 10/28/2021 at 10:21 AM

## 2022-02-17 ENCOUNTER — HOSPITAL ENCOUNTER (OUTPATIENT)
Dept: OCCUPATIONAL THERAPY | Age: 57
Setting detail: THERAPIES SERIES
Discharge: HOME | End: 2022-02-17
Payer: COMMERCIAL

## 2022-02-17 PROCEDURE — 97165 OT EVAL LOW COMPLEX 30 MIN: CPT

## 2022-02-17 PROCEDURE — 97110 THERAPEUTIC EXERCISES: CPT

## 2022-02-17 NOTE — PROGRESS NOTES
** PLEASE SIGN, DATE AND TIME CERTIFICATION BELOW AND RETURN TO [facility] OUTPATIENT REHABILITATION (FAX #: [phone]). ATTEST/CO-SIGN IF ACCESSING VIA INPostling. THANK YOU.**    I certify that I have examined the patient below and determined that Physical Medicine and Rehabilitation service is necessary and that I approve the established plan of care for up to 90 days or as specifically noted. Attestation, signature or co-signature of physician indicates approval of certification requirements.    ________________________ ____________ __________  Physician Signature   Date   Time   [address]  [x] EVALUATION  [] DAILY NOTE (LAND) [] DAILY NOTE (AQUATIC ) [] PROGRESS NOTE [] DISCHARGE NOTE    [x] [facility]   [] [facility] 90    [] [facility]   [] Kd Haley    Date: 2022  Patient Name:  Michael Victoria  : 1965  MRN: [id number]  CSN: [id number]    Referring Practitioner Aurora Contreras MD   Diagnosis ORIF LEFT     Treatment Diagnosis ORIF Left humerus, decreased ROM, decreased strength   Date of Evaluation 22      Functional Outcome Measure Used UEFI   Functional Outcome Score 32/80 (22)       Insurance: Primary: Payor: Mariah Palmer /  /  / ,   Secondary:    Authorization Information:   PRE CERTIFICATION REQUIRED: NO   INSURANCE THERAPY BENEFIT:  60 VISITS PT/OT/ST COMBINED PER CALENDAR YEAR   AQUATIC THERAPY COVERED: YES  MODALITIES COVERED:  YES   Visit # 1, 1/10 for progress note   Visits Allowed: 60 visits    Recertification Date:    Physician Follow-Up: 3-02-22   Physician Orders: OT eval and treat . Pertinent History: PT fell from a horse on 10-23-21, underwent an ORIF of the proximal humerus on 10-28-21. Was off work until last week. SUBJECTIVE: cooperative, PT was unsure if the physician stated to avoid overhead lifting.  Clarified with PA after evaluation who Internal Rotation     []  Shoulder Strength is grossly WFL. Elbow Flexion     Elbow Extension     Forearm Pronation     Forearm Supination     [] Elbow Strength is grossly WFL. Wrist Flexion     Wrist Extension     Wrist Radial Deviation     Wrist Ulnar Deviation     []  Wrist Strength is grossly WFL. Right Left    Strength Setting:      Pinch Strength Tip Pinch:      Lateral Pinch           If no ratings are recorded, no formal assessment was completed. TREATMENT   Precautions: No lifting >  50 #,   Pain:  0/10 at rest, 3/10 with movement. Increased to 6-8/10 at worst    X in shaded column indicates Activity Completed Today   Modalities Parameters/  Location  Notes/Comments                     Manual Therapy Time/  Technique  Notes/Comments                     Exercises   Sets/  Sec Reps  Notes/Comments   Table slide sh flexion to warm up, scap retraction 1 5 x    Supine dowel adelina, sh flex, ABD, ER at side 1 5 x    Pectoralis stretch in corner hands at 60 degrees 1 5 x                                Activities Time    Notes/Comments                       Specific Interventions Next Treatment: MHP with PROM ex, AAROM, AROM, progressive strengthening as able     Activity/Treatment Tolerance:  [x]  Patient tolerated treatment well  []  Patient limited by fatigue  []  Patient limited by pain   []  Patient limited by other medical complications  []  Other:     Assessment: Pt presents with a decline in independent functioning with decreased ROM and increased stiffness noted following a left humeral fracture. She currently has adapted techniques for reaching as well as adapted means of dressing self due to stiffness . SHe would benefit from additional skilled OT services to address increasing ROM and strength to return to ADL and work related tasks with ease.      Areas for Improvement: impaired activity tolerance, impaired ROM, impaired strength and pain  Prognosis: good    GOALS:  Patient Goal: I want to be able to move my arm again without pain. Short Term Goals:  Time Frame: 10 sessions. *  Patient will increase left shoulder AROM greater than 85degrees flexion, 80 degrees abduction, to middle of waistband for IR and 50 degrees ER with the arm at the side to increase ease and ability to put on a shirt. *  Patient will increase left shoulder PROM greater than 95 degrees flexion, 85 degrees abduction, 65 degrees IR and 50 degrees ER in abduction to increase ease and ability to wash and style hair. *  Pt. will report decreased pain with left shoulder to no greater than 3/10 for ability to ease for donning a bra  *  Patient will demonstrate I knowledge of HEP for improved flexibility and strength for lifting. Long Term Goals:  Time Frame: 8 weeks   *  Pt. will demo 4+/5 MMT right shoulder for independence with exercise routine   *  Patient will improve UEFS to at least 60/80  * Patient will demonstrate ability to reach overhead for an object with affected extremity without increased pain. Patient Education:    [x]  HEP/Education Completed: Plan of Care, Goals,    [address] Access Code for HEP: Access Code: [email or web address]   URL: [email or web address]   Date: 02/17/2022   Prepared by: Linsey Penny    Exercises   Standing Scapular Retraction - 2-3 x daily - 7 x weekly - 1 sets - 10 reps   Supine Shoulder Flexion Extension AAROM with Dowel - 2 x daily - 7 x weekly - 1 sets - 10 reps   Seated Shoulder Flexion Towel Slide at Table Top - 2-3 x daily - 7 x weekly - 1 sets - 10 reps   Supine Shoulder External Rotation in 45 Degrees Abduction AAROM with Dowel - 2 x daily - 7 x weekly - 1 sets - 10 reps   Standing Shoulder Posterior Capsule Stretch - 2-3 x daily - 7 x weekly - 1 sets - 10 reps   Supine Shoulder Abduction AAROM with Dowel - 2 x daily - 7 x weekly - 1 sets - 10 reps   Standing Bilateral Shoulder Internal Rotation AAROM with Dowel - 2 x daily - 7 x weekly - 1 sets - 10 reps   Doorway Pec Stretch at 60 Degrees Abduction with Arm Straight - 2-3 x daily - 7 x weekly - 1 sets - 10 reps     []  No new Education completed  []  Reviewed Prior HEP      [x]  Patient verbalized and/or demonstrated understanding of education provided. []  Patient unable to verbalize and/or demonstrate understanding of education provided. Will continue education. [x]  Barriers to learning: none    PLAN:  Treatment Recommendations: Strengthening, Range of Motion, Manual Therapy - Soft Tissue Mobilization, Manual Therapy - Joint Manipulation, Pain Management, Home Exercise Program, Patient Education, Safety Education and Training and Modalities    [x]  Plan of care initiated. Plan to see patient 2 times per week for 8 weeks to address the treatment planned outlined above.   []  Continue with current plan of care  []  Modify plan of care as follows:    []  Hold pending physician visit  []  Discharge    Time In 1305   Time Out 1350   Timed Code Minutes: 20 min   Total Treatment Time: 45 min       Electronically Signed by: MICHAELA Coon OTR/L [id number]

## 2022-02-21 ENCOUNTER — HOSPITAL ENCOUNTER (OUTPATIENT)
Dept: OCCUPATIONAL THERAPY | Age: 57
Setting detail: THERAPIES SERIES
Discharge: HOME | End: 2022-02-21
Payer: COMMERCIAL

## 2022-02-21 PROCEDURE — 97110 THERAPEUTIC EXERCISES: CPT

## 2022-02-21 PROCEDURE — 97140 MANUAL THERAPY 1/> REGIONS: CPT

## 2022-02-21 NOTE — PROGRESS NOTES
[address]  [] EVALUATION  [x] DAILY NOTE (LAND) [] DAILY NOTE (AQUATIC ) [] PROGRESS NOTE [] DISCHARGE NOTE    [x] OUTPATIENT REHABILITATION CENTER [facility]   [] [place] [address]    [] [facility]   [] Isabel Jurado    Date: 2022  Patient Name:  Tanesha Ingram  : 1965  MRN: [id number]  CSN: [id number]    Referring Practitioner Manish Gonzalez MD   Diagnosis ORIF LEFT     Treatment Diagnosis ORIF Left humerus, decreased ROM, decreased strength   Date of Evaluation 22      Functional Outcome Measure Used UEFI   Functional Outcome Score 32/80 (22)       Insurance: Primary: Payor: Luigi Bland /  /  / ,   Secondary:    Authorization Information:   PRE CERTIFICATION REQUIRED: NO   INSURANCE THERAPY BENEFIT:  60 VISITS PT/OT/ST COMBINED PER CALENDAR YEAR   AQUATIC THERAPY COVERED: YES  MODALITIES COVERED:  YES   Visit # 2, 2/10 for progress note   Visits Allowed: 60 visits    Recertification Date:    Physician Follow-Up: 3-02-22   Physician Orders: OT eval and treat . Pertinent History: PT fell from a horse on 10-23-21, underwent an ORIF of the proximal humerus on 10-28-21. Was off work until last week. SUBJECTIVE: Patient reports compliance with HEP over the weekend with reference \"I can see small improvements in my ROM already\". TREATMENT   Precautions: No lifting >  50 #,   Pain:  0/10 at rest, stiffness noted this date   3/10 with movement. Increased to 6-8/10 at worst    X in shaded column indicates Activity Completed Today   Modalities Parameters/  Location  Notes/Comments                     Manual Therapy Time/  Technique  Notes/Comments   PROM L shoulder all motions to tolerance  x With MHP this date   Discomfort and pain noted ~90 abd, ~100 flexion.     sidelying GH mobs and scapular mobilizations   x          Exercises   Sets/  Sec Reps  Notes/Comments   Table slide sh flexion to warm up, scap retraction 1 10 ea x    Supine dowel adelina, sh flex, ABD, ER at side 1 10 ea x    Pectoralis stretch in corner hands at 60 degrees and at 90 degrees for pec major  15 sec 5 ea x Patient reports not much of a stretch noted anymore with 60 degrees. Trial pec major hands at 90 with stretch noted   Posterior capsule stretch  15 sec 4 x    sidelying ER AROM towel roll  1 10 x Initiated. Fair ROM noted    French ball wall walks  1 5 x Initiated. Fair tolerance, tight end range                         Activities Time    Notes/Comments                       Specific Interventions Next Treatment: MHP with PROM ex, AAROM, AROM, progressive strengthening as able     Activity/Treatment Tolerance:  [x]  Patient tolerated treatment well  []  Patient limited by fatigue  []  Patient limited by pain   []  Patient limited by other medical complications  []  Other:     Assessment: Patient is slowly progressing towards goals. Very tight end range flexion and abduction, with fair improvements noted throughout AAROM. Areas for Improvement: impaired activity tolerance, impaired ROM, impaired strength and pain  Prognosis: good    GOALS:  Patient Goal: I want to be able to move my arm again without pain. Short Term Goals:  Time Frame: 10 sessions. *  Patient will increase left shoulder AROM greater than 85degrees flexion, 80 degrees abduction, to middle of waistband for IR and 50 degrees ER with the arm at the side to increase ease and ability to put on a shirt. *  Patient will increase left shoulder PROM greater than 95 degrees flexion, 85 degrees abduction, 65 degrees IR and 50 degrees ER in abduction to increase ease and ability to wash and style hair. *  Pt. will report decreased pain with left shoulder to no greater than 3/10 for ability to ease for donning a bra  *  Patient will demonstrate I knowledge of [facility] for improved flexibility and strength for lifting.     Long Term Goals:  Time Frame: 8 weeks   *  Pt. will demo 4+/5 MMT right shoulder for independence with exercise routine   *  Patient will improve UEFS to at least 60/80  * Patient will demonstrate ability to reach overhead for an object with affected extremity without increased pain. Patient Education:    [x]  HEP/Education Completed: Plan of Care, Goals,    [address] Access Code for HEP: Access Code: [email or web address]   URL: [email or web address]   Date: 02/17/2022   Prepared by: Gem Loya    Exercises   Standing Scapular Retraction - 2-3 x daily - 7 x weekly - 1 sets - 10 reps   Supine Shoulder Flexion Extension AAROM with Dowel - 2 x daily - 7 x weekly - 1 sets - 10 reps   Seated Shoulder Flexion Towel Slide at Table Top - 2-3 x daily - 7 x weekly - 1 sets - 10 reps   Supine Shoulder External Rotation in 45 Degrees Abduction AAROM with Dowel - 2 x daily - 7 x weekly - 1 sets - 10 reps   Standing Shoulder Posterior Capsule Stretch - 2-3 x daily - 7 x weekly - 1 sets - 10 reps   Supine Shoulder Abduction AAROM with Dowel - 2 x daily - 7 x weekly - 1 sets - 10 reps   Standing Bilateral Shoulder Internal Rotation AAROM with Dowel - 2 x daily - 7 x weekly - 1 sets - 10 reps   Doorway Pec Stretch at 60 Degrees Abduction with Arm Straight - 2-3 x daily - 7 x weekly - 1 sets - 10 reps   NEW 2/21/22: Sidelying ER AROM, wall walks, corner pec major stretch   []  No new Education completed  [x]  Reviewed Prior HEP      [x]  Patient verbalized and/or demonstrated understanding of education provided. []  Patient unable to verbalize and/or demonstrate understanding of education provided. Will continue education. [x]  Barriers to learning: none    PLAN:  Treatment Recommendations: Strengthening, Range of Motion, Manual Therapy - Soft Tissue Mobilization, Manual Therapy - Joint Manipulation, Pain Management, Home Exercise Program, Patient Education, Safety Education and Training and Modalities    []  Plan of care initiated.   Plan to see patient 2 times per week for 8 weeks to address the treatment planned outlined above. [x]  Continue with current plan of care  []  Modify plan of care as follows:    []  Hold pending physician visit  []  Discharge    Time In 1530   Time Out 1615   Timed Code Minutes: 45 min   Total Treatment Time: 45 min       Electronically Signed by:  Casey PACE #[id number]

## 2022-02-24 ENCOUNTER — HOSPITAL ENCOUNTER (OUTPATIENT)
Dept: OCCUPATIONAL THERAPY | Age: 57
Setting detail: THERAPIES SERIES
Discharge: HOME | End: 2022-02-24
Payer: COMMERCIAL

## 2022-02-24 PROCEDURE — 97110 THERAPEUTIC EXERCISES: CPT

## 2022-02-24 NOTE — PROGRESS NOTES
[address]  [] EVALUATION  [x] DAILY NOTE (LAND) [] DAILY NOTE (AQUATIC ) [] PROGRESS NOTE [] DISCHARGE NOTE    [x] OUTPATIENT REHABILITATION [facility]   [] [place] [address]    [] [facility]   [] [facility]    Date: 2022  Patient Name:  Mojgan Long  : 1965  MRN: [id number]  CSN: [id number]    Referring Practitioner Karly Claros MD   Diagnosis ORIF LEFT     Treatment Diagnosis ORIF Left humerus, decreased ROM, decreased strength   Date of Evaluation 22      Functional Outcome Measure Used UEFI   Functional Outcome Score 32/80 (22)       Insurance: Primary: Payor: Lyndsey Franz /  /  / ,   Secondary:    Authorization Information:   PRE CERTIFICATION REQUIRED: NO   INSURANCE THERAPY BENEFIT:  60 VISITS PT/OT/ST COMBINED PER CALENDAR YEAR   AQUATIC THERAPY COVERED: YES  MODALITIES COVERED:  YES   Visit # 3, 3/10 for progress note   Visits Allowed: 60 visits    Recertification Date:    Physician Follow-Up: 3-02-22   Physician Orders: OT eval and treat . Pertinent History: PT fell from a horse on 10-23-21, underwent an ORIF of the proximal humerus on 10-28-21. Was off work until last week. SUBJECTIVE: Pt. Reports 3/10 for pain in the L shoulder. Reports compliance with her HEP over the last few days. TREATMENT   Precautions: No lifting >  50 #,   Pain:  3/10    X in shaded column indicates Activity Completed Today   Modalities Parameters/  Location  Notes/Comments                     Manual Therapy Time/  Technique  Notes/Comments   PROM L shoulder all motions to tolerance  x With MHP this date   Pt.  Reports \"feels better than last session\"    sidelying GH mobs and scapular mobilizations   x Supine GH mobs only this date          Exercises   Sets/  Sec Reps  Notes/Comments   Table slide sh flexion to warm up, scap retraction 1 10 ea     Supine dowel adelina, sh flex, ABD, ER at side 1 10 ea x affected extremity without increased pain. Patient Education:    []  HEP/Education Completed: Plan of Care, Goals,    [address] Access Code for HEP: Access Code: [facility]   URL: [email or web address]   Date: 02/17/2022   Prepared by: Monica Hayward    Exercises   Standing Scapular Retraction - 2-3 x daily - 7 x weekly - 1 sets - 10 reps   Supine Shoulder Flexion Extension AAROM with Dowel - 2 x daily - 7 x weekly - 1 sets - 10 reps   Seated Shoulder Flexion Towel Slide at Table Top - 2-3 x daily - 7 x weekly - 1 sets - 10 reps   Supine Shoulder External Rotation in 45 Degrees Abduction AAROM with Dowel - 2 x daily - 7 x weekly - 1 sets - 10 reps   Standing Shoulder Posterior Capsule Stretch - 2-3 x daily - 7 x weekly - 1 sets - 10 reps   Supine Shoulder Abduction AAROM with Dowel - 2 x daily - 7 x weekly - 1 sets - 10 reps   Standing Bilateral Shoulder Internal Rotation AAROM with Dowel - 2 x daily - 7 x weekly - 1 sets - 10 reps   Doorway Pec Stretch at 60 Degrees Abduction with Arm Straight - 2-3 x daily - 7 x weekly - 1 sets - 10 reps   NEW 2/21/22: Sidelying ER AROM, wall walks, corner pec major stretch   [x]  No new Education completed  []  Reviewed Prior HEP      []  Patient verbalized and/or demonstrated understanding of education provided. []  Patient unable to verbalize and/or demonstrate understanding of education provided. Will continue education. [x]  Barriers to learning: none    PLAN:  Treatment Recommendations: Strengthening, Range of Motion, Manual Therapy - Soft Tissue Mobilization, Manual Therapy - Joint Manipulation, Pain Management, Home Exercise Program, Patient Education, Safety Education and Training and Modalities    []  Plan of care initiated. Plan to see patient 2 times per week for 8 weeks to address the treatment planned outlined above.   [x]  Continue with current plan of care  []  Modify plan of care as follows:    []  Hold pending physician visit  [] Discharge    Time In 1630   Time Out 1700   Timed Code Minutes: 30 min   Total Treatment Time: 30 min        Electronically Signed by:  LAKISHA Garcia/EBENEZER Daigle 70, OTR/L [id number]

## 2022-02-28 ENCOUNTER — HOSPITAL ENCOUNTER (OUTPATIENT)
Dept: OCCUPATIONAL THERAPY | Age: 57
Setting detail: THERAPIES SERIES
Discharge: HOME | End: 2022-02-28
Payer: COMMERCIAL

## 2022-02-28 PROCEDURE — 97110 THERAPEUTIC EXERCISES: CPT

## 2022-02-28 PROCEDURE — 97140 MANUAL THERAPY 1/> REGIONS: CPT

## 2022-02-28 NOTE — PROGRESS NOTES
[address]  [] EVALUATION  [x] DAILY NOTE (LAND) [] DAILY NOTE (AQUATIC ) [] PROGRESS NOTE [] DISCHARGE NOTE    [x] OUTPATIENT REHABILITATION CENTER [facility]   [] [place] [address]    [] [facility]   [] Manuela Olivery    Date: 2022  Patient Name:  Genesis Webb  : 1965  MRN: [id number]  CSN: [id number]    Referring Practitioner Chaim Contreras MD   Diagnosis ORIF LEFT     Treatment Diagnosis ORIF Left humerus, decreased ROM, decreased strength   Date of Evaluation 22      Functional Outcome Measure Used UEFI   Functional Outcome Score 32/80 (22)       Insurance: Primary: Payor: [employer] /  /  / ,   Secondary:    Authorization Information:   PRE CERTIFICATION REQUIRED: NO   INSURANCE THERAPY BENEFIT:  60 VISITS PT/OT/ST COMBINED PER CALENDAR YEAR   AQUATIC THERAPY COVERED: YES  MODALITIES COVERED:  YES   Visit # 4, /10 for progress note   Visits Allowed: 60 visits    Recertification Date: 3-79-87   Physician Follow-Up: 3-02-22   Physician Orders: OT eval and treat . Pertinent History: PT fell from a horse on 10-23-21, underwent an ORIF of the proximal humerus on 10-28-21. Was off work until last week. SUBJECTIVE: Patient reports she is doing good overall, wished her ROM was better.       TREATMENT   Precautions: No lifting >  50 #,   Pain:  3/10    X in shaded column indicates Activity Completed Today   Modalities Parameters/  Location  Notes/Comments   MHP to L shoulder x 10 min stretching into ER and IR while on heat  X                Manual Therapy Time/  Technique  Notes/Comments   PROM L shoulder all motions to tolerance  x    sidelying GH mobs and scapular mobilizations   x Supine GH mobs only this date          Exercises   Sets/  Sec Reps  Notes/Comments   Table slide sh flexion to warm up, scap retraction 1 10 ea     Supine dowel adelina, sh flex, ABD, ER at side 1 10 ea     Pectoralis stretch in corner hands at 60 degrees and at 90 degrees for pec major  15 sec 5 ea  Good stretch noted    Posterior capsule stretch  15 sec 4  Overpressure from therapist    sidelying ER AROM towel roll  1 10  Initiated. Fair ROM noted    Nicar[place]uan ball wall walks  1 5     pulley 1 10 X    Supine AROM chest press, circles cw/ccw 1 15 ea X    Supine AROM flexion bilaterally 1 10 X    Supine ER/IR hands behind head 1 10 X                         Activities Time    Notes/Comments                       Specific Interventions Next Treatment: MHP with PROM ex, AAROM, AROM, progressive strengthening as able     Activity/Treatment Tolerance:  [x]  Patient tolerated treatment well  []  Patient limited by fatigue  []  Patient limited by pain   []  Patient limited by other medical complications  []  Other:     Assessment: Patient is slowly progressing towards goals with improvements in all planes noted. Areas for Improvement: impaired activity tolerance, impaired ROM, impaired strength and pain  Prognosis: good    GOALS:  Patient Goal: I want to be able to move my arm again without pain. Short Term Goals:  Time Frame: 10 sessions. *  Patient will increase left shoulder AROM greater than 85degrees flexion, 80 degrees abduction, to middle of waistband for IR and 50 degrees ER with the arm at the side to increase ease and ability to put on a shirt. *  Patient will increase left shoulder PROM greater than 95 degrees flexion, 85 degrees abduction, 65 degrees IR and 50 degrees ER in abduction to increase ease and ability to wash and style hair. *  Pt. will report decreased pain with left shoulder to no greater than 3/10 for ability to ease for donning a bra  *  Patient will demonstrate I knowledge of HEP for improved flexibility and strength for lifting.     Long Term Goals:  Time Frame: 8 weeks   *  Pt. will demo 4+/5 MMT right shoulder for independence with exercise routine   *  Patient will improve UEFS to at least 60/80  * Patient will demonstrate ability to reach overhead for an object with affected extremity without increased pain. Patient Education:    []  HEP/Education Completed: Plan of Care, Goals,    [address] Access Code for HEP: Access Code: [facility]   URL: [email or web address]   Date: 02/17/2022   Prepared by: Dilip Rivera    Exercises   Standing Scapular Retraction - 2-3 x daily - 7 x weekly - 1 sets - 10 reps   Supine Shoulder Flexion Extension AAROM with Dowel - 2 x daily - 7 x weekly - 1 sets - 10 reps   Seated Shoulder Flexion Towel Slide at Table Top - 2-3 x daily - 7 x weekly - 1 sets - 10 reps   Supine Shoulder External Rotation in 45 Degrees Abduction AAROM with Dowel - 2 x daily - 7 x weekly - 1 sets - 10 reps   Standing Shoulder Posterior Capsule Stretch - 2-3 x daily - 7 x weekly - 1 sets - 10 reps   Supine Shoulder Abduction AAROM with Dowel - 2 x daily - 7 x weekly - 1 sets - 10 reps   Standing Bilateral Shoulder Internal Rotation AAROM with Dowel - 2 x daily - 7 x weekly - 1 sets - 10 reps   Doorway Pec Stretch at 60 Degrees Abduction with Arm Straight - 2-3 x daily - 7 x weekly - 1 sets - 10 reps   NEW 2/21/22: Sidelying ER AROM, wall walks, corner pec major stretch   [x]  No new Education completed  []  Reviewed Prior HEP      []  Patient verbalized and/or demonstrated understanding of education provided. []  Patient unable to verbalize and/or demonstrate understanding of education provided. Will continue education. [x]  Barriers to learning: none    PLAN:  Treatment Recommendations: Strengthening, Range of Motion, Manual Therapy - Soft Tissue Mobilization, Manual Therapy - Joint Manipulation, Pain Management, Home Exercise Program, Patient Education, Safety Education and Training and Modalities    []  Plan of care initiated. Plan to see patient 2 times per week for 8 weeks to address the treatment planned outlined above.   [x]  Continue with current plan of care  []  Modify plan of care as follows:    []  Hold pending physician visit  []  Discharge    Time In 1630   Time Out 1725   Timed Code Minutes: 55 min   Total Treatment Time: 55 min        Electronically Signed by: Carly GRANADO, T #[id number]

## 2022-03-04 ENCOUNTER — HOSPITAL ENCOUNTER (OUTPATIENT)
Dept: OCCUPATIONAL THERAPY | Age: 57
Setting detail: THERAPIES SERIES
End: 2022-03-04
Payer: COMMERCIAL

## 2022-03-07 ENCOUNTER — HOSPITAL ENCOUNTER (OUTPATIENT)
Dept: OCCUPATIONAL THERAPY | Age: 57
Setting detail: THERAPIES SERIES
Discharge: HOME | End: 2022-03-07
Payer: COMMERCIAL

## 2022-03-07 PROCEDURE — 97140 MANUAL THERAPY 1/> REGIONS: CPT

## 2022-03-07 PROCEDURE — 97110 THERAPEUTIC EXERCISES: CPT

## 2022-03-07 NOTE — PROGRESS NOTES
[address]  [] EVALUATION  [x] DAILY NOTE (LAND) [] DAILY NOTE (AQUATIC ) [] PROGRESS NOTE [] DISCHARGE NOTE    [x] OUTPATIENT REHABILITATION [facility]   [] [place] [address]    [] [facility]   [] [place]    Date: 3/7/2022  Patient Name:  Mariam Carbajal  : 1965  MRN: [id number]  CSN: [id number]    Referring Practitioner Bere Wilkerson MD   Diagnosis ORIF LEFT     Treatment Diagnosis ORIF Left humerus, decreased ROM, decreased strength   Date of Evaluation 22      Functional Outcome Measure Used UEFI   Functional Outcome Score 32/80 (22)       Insurance: Primary: Payor: Ceferino Alves /  /  / ,   Secondary:    Authorization Information:   PRE CERTIFICATION REQUIRED: NO   INSURANCE THERAPY BENEFIT:  60 VISITS PT/OT/ST COMBINED PER CALENDAR YEAR   AQUATIC THERAPY COVERED: YES  MODALITIES COVERED:  YES   Visit # 5, 5/10 for progress note   Visits Allowed: 60 visits    Recertification Date:    Physician Follow-Up: 22   Physician Orders: OT eval and treat . Pertinent History: PT fell from a horse on 10-23-21, underwent an ORIF of the proximal humerus on 10-28-21. Was off work until last week. SUBJECTIVE:  Patient reports she returned to [facility]. She stated she saw the NP that works with Dr. Christina Vega. They took x-rays and she said she was informed that her bone is still not completely healed. They are ordering a bone stimulator for her.       TREATMENT   Precautions: No lifting >  50 #,   Pain:  3/10    X in shaded column indicates Activity Completed Today   Modalities Parameters/  Location  Notes/Comments   MHP to L shoulder x 10 min stretching into ER and IR while on heat  X                Manual Therapy Time/  Technique  Notes/Comments   PROM L shoulder all motions to tolerance  x    sidelying GH mobs and scapular mobilizations   x Supine GH mobs only this date    IASTM L shoulder including deltoid, RC, upper trap, bicep, pec minor  X    Exercises   Sets/  Sec Reps  Notes/Comments   Table slide sh flexion to warm up, scap retraction 1 10 ea     Supine dowel adelina, sh flex, ABD, ER at side 1 10 ea     Pectoralis stretch in corner hands at 60 degrees and at 90 degrees for pec major  15 sec 5 ea  Good stretch noted    Posterior capsule stretch  15 sec 4  Overpressure from therapist    sidelying ER AROM towel roll  1 10  Initiated. Fair ROM noted    Ugandan ball wall walks  1 5     pulley 1 10 X    Supine AROM chest press, circles cw/ccw 1 15 ea X    Supine AROM flexion bilaterally 1 10 X    Supine ER/IR hands behind head 1 10 X                         Activities Time    Notes/Comments                       Specific Interventions Next Treatment: MHP with PROM ex, AAROM, AROM, progressive strengthening as able     Activity/Treatment Tolerance:  [x]  Patient tolerated treatment well  []  Patient limited by fatigue  []  Patient limited by pain   []  Patient limited by other medical complications  []  Other:     Assessment: Patient is slowly progressing towards goals with improvements in all planes noted. Areas for Improvement: impaired activity tolerance, impaired ROM, impaired strength and pain  Prognosis: good    GOALS:  Patient Goal: I want to be able to move my arm again without pain. Short Term Goals:  Time Frame: 10 sessions. *  Patient will increase left shoulder AROM greater than 85degrees flexion, 80 degrees abduction, to middle of waistband for IR and 50 degrees ER with the arm at the side to increase ease and ability to put on a shirt. *  Patient will increase left shoulder PROM greater than 95 degrees flexion, 85 degrees abduction, 65 degrees IR and 50 degrees ER in abduction to increase ease and ability to wash and style hair.     *  Pt. will report decreased pain with left shoulder to no greater than 3/10 for ability to ease for donning a bra  *  Patient will demonstrate I knowledge of HEP for improved flexibility and strength for lifting. Long Term Goals:  Time Frame: 8 weeks   *  Pt. will demo 4+/5 MMT right shoulder for independence with exercise routine   *  Patient will improve UEFS to at least 60/80  * Patient will demonstrate ability to reach overhead for an object with affected extremity without increased pain. Patient Education:    []  HEP/Education Completed: Plan of Care, Goals,    [address] Access Code for HEP: Access Code: [facility]   Standing Scapular Retraction - 2-3 x daily - 7 x weekly - 1 sets - 10 reps   Supine Shoulder Flexion Extension AAROM with Dowel - 2 x daily - 7 x weekly - 1 sets - 10 reps   Seated Shoulder Flexion Towel Slide at Table Top - 2-3 x daily - 7 x weekly - 1 sets - 10 reps   Supine Shoulder External Rotation in 45 Degrees Abduction AAROM with Dowel - 2 x daily - 7 x weekly - 1 sets - 10 reps   Standing Shoulder Posterior Capsule Stretch - 2-3 x daily - 7 x weekly - 1 sets - 10 reps   Supine Shoulder Abduction AAROM with Dowel - 2 x daily - 7 x weekly - 1 sets - 10 reps   Standing Bilateral Shoulder Internal Rotation AAROM with Dowel - 2 x daily - 7 x weekly - 1 sets - 10 reps   Doorway Pec Stretch at 60 Degrees Abduction with Arm Straight - 2-3 x daily - 7 x weekly - 1 sets - 10 reps   NEW 2/21/22: Sidelying ER AROM, wall walks, corner pec major stretch   [x]  No new Education completed  []  Reviewed Prior HEP      []  Patient verbalized and/or demonstrated understanding of education provided. []  Patient unable to verbalize and/or demonstrate understanding of education provided. Will continue education. [x]  Barriers to learning: none    PLAN:  Treatment Recommendations: Strengthening, Range of Motion, Manual Therapy - Soft Tissue Mobilization, Manual Therapy - Joint Manipulation, Pain Management, Home Exercise Program, Patient Education, Safety Education and Training and Modalities    []  Plan of care initiated.   Plan to see patient 2 times per week for 8 weeks to address the treatment planned outlined above.   [x]  Continue with current plan of care  []  Modify plan of care as follows:    []  Hold pending physician visit  []  Discharge    Time In 1615   Time Out 1710   Timed Code Minutes: 55 min   Total Treatment Time: 55 min        Electronically Signed by: Cristina DAILEY/VAL, CHT #[id number]

## 2022-03-11 ENCOUNTER — HOSPITAL ENCOUNTER (OUTPATIENT)
Dept: OCCUPATIONAL THERAPY | Age: 57
Setting detail: THERAPIES SERIES
Discharge: HOME | End: 2022-03-11
Payer: COMMERCIAL

## 2022-03-11 PROCEDURE — 97140 MANUAL THERAPY 1/> REGIONS: CPT

## 2022-03-11 PROCEDURE — 97110 THERAPEUTIC EXERCISES: CPT

## 2022-03-11 NOTE — PROGRESS NOTES
[address]  [] EVALUATION  [x] DAILY NOTE (LAND) [] DAILY NOTE (AQUATIC ) [] PROGRESS NOTE [] DISCHARGE NOTE    [x] OUTPATIENT REHABILITATION [facility]   [] [place] [address]    [] [facility]   [] Gurjit Hart    Date: 3/11/2022  Patient Name:  Dariela Enciso  : 1965  MRN: [id number]  CSN: [id number]    Referring Practitioner [place] MD Svetlana   Diagnosis ORIF LEFT     Treatment Diagnosis ORIF Left humerus, decreased ROM, decreased strength   Date of Evaluation 22      Functional Outcome Measure Used UEFI   Functional Outcome Score 32/80 (22)       Insurance: Primary: Payor: Cherelle Gomez /  /  / ,   Secondary:    Authorization Information:   PRE CERTIFICATION REQUIRED: NO   INSURANCE THERAPY BENEFIT:  60 VISITS PT/OT/ST COMBINED PER CALENDAR YEAR   AQUATIC THERAPY COVERED: YES  MODALITIES COVERED:  YES   Visit # 6, 6/10 for progress note   Visits Allowed: 60 visits    Recertification Date: 78   Physician Follow-Up: 22   Physician Orders: OT eval and treat . Pertinent History: PT fell from a horse on 10-23-21, underwent an ORIF of the proximal humerus on 10-28-21. Was off work until last week. SUBJECTIVE:  Patient reports doing well, still waiting on receiving her bone stimulator.       TREATMENT   Precautions: Fracture not fully healed, patient to receive bone stimulator No lifting >  50 #,    Pain:  3/10    X in shaded column indicates Activity Completed Today   Modalities Parameters/  Location  Notes/Comments   MHP to L shoulder x 10 min stretching into ER and IR while on heat  X                Manual Therapy Time/  Technique  Notes/Comments   PROM L shoulder all motions to tolerance  x    sidelying GH mobs and scapular mobilizations   x Supine GH mobs only this date    IASTM L shoulder including deltoid, RC, upper trap, bicep, pec minor  X    Exercises   Sets/  Sec Reps  Notes/Comments   Table slide sh flexion to warm up, scap retraction 1 10 ea     Supine dowel adelina, sh flex, ABD, ER at side 1 10 ea     Pectoralis stretch in corner hands at 60 degrees and at 90 degrees for pec major  15 sec 5 ea  Good stretch noted    Posterior capsule stretch  15 sec 4  Overpressure from therapist    sidelying ER AROM towel roll  1 10  Initiated. Fair ROM noted    [place]egalese ball wall walks  1 7 X    pulley 1 10 X    Supine AROM chest press, circles cw/ccw 1 15 ea X    Supine AROM flexion bilaterally 1 10 X    Supine ER/IR hands behind head 1 10 X                         Activities Time    Notes/Comments                       Specific Interventions Next Treatment: MHP with PROM ex, AAROM, AROM, progressive strengthening as able     Activity/Treatment Tolerance:  [x]  Patient tolerated treatment well  []  Patient limited by fatigue  []  Patient limited by pain   []  Patient limited by other medical complications  []  Other:     Assessment: Patient is slowly progressing towards goals with improvements in all planes noted. Areas for Improvement: impaired activity tolerance, impaired ROM, impaired strength and pain  Prognosis: good    GOALS:  Patient Goal: I want to be able to move my arm again without pain. Short Term Goals:  Time Frame: 10 sessions. *  Patient will increase left shoulder AROM greater than 85degrees flexion, 80 degrees abduction, to middle of waistband for IR and 50 degrees ER with the arm at the side to increase ease and ability to put on a shirt. *  Patient will increase left shoulder PROM greater than 95 degrees flexion, 85 degrees abduction, 65 degrees IR and 50 degrees ER in abduction to increase ease and ability to wash and style hair. *  Pt. will report decreased pain with left shoulder to no greater than 3/10 for ability to ease for donning a bra  *  Patient will demonstrate I knowledge of HEP for improved flexibility and strength for lifting.     Long Term Goals:  Time Frame: 8 weeks   *  Pt. will demo 4+/5 MMT right shoulder for independence with exercise routine   *  Patient will improve UEFS to at least 60/80  * Patient will demonstrate ability to reach overhead for an object with affected extremity without increased pain. Patient Education:    []  HEP/Education Completed: Plan of Care, Goals,    Oli Winter Access Code for HEP: Access Code: [facility]   Standing Scapular Retraction - 2-3 x daily - 7 x weekly - 1 sets - 10 reps   Supine Shoulder Flexion Extension AAROM with Dowel - 2 x daily - 7 x weekly - 1 sets - 10 reps   Seated Shoulder Flexion Towel Slide at Table Top - 2-3 x daily - 7 x weekly - 1 sets - 10 reps   Supine Shoulder External Rotation in 45 Degrees Abduction AAROM with Dowel - 2 x daily - 7 x weekly - 1 sets - 10 reps   Standing Shoulder Posterior Capsule Stretch - 2-3 x daily - 7 x weekly - 1 sets - 10 reps   Supine Shoulder Abduction AAROM with Dowel - 2 x daily - 7 x weekly - 1 sets - 10 reps   Standing Bilateral Shoulder Internal Rotation AAROM with Dowel - 2 x daily - 7 x weekly - 1 sets - 10 reps   Doorway Pec Stretch at 60 Degrees Abduction with Arm Straight - 2-3 x daily - 7 x weekly - 1 sets - 10 reps   NEW 2/21/22: Sidelying ER AROM, wall walks, corner pec major stretch   [x]  No new Education completed  []  Reviewed Prior HEP      []  Patient verbalized and/or demonstrated understanding of education provided. []  Patient unable to verbalize and/or demonstrate understanding of education provided. Will continue education. [x]  Barriers to learning: none    PLAN:  Treatment Recommendations: Strengthening, Range of Motion, Manual Therapy - Soft Tissue Mobilization, Manual Therapy - Joint Manipulation, Pain Management, Home Exercise Program, Patient Education, Safety Education and Training and Modalities    []  Plan of care initiated. Plan to see patient 2 times per week for 8 weeks to address the treatment planned outlined above.   [x]  Continue with current plan of care  []  Modify plan of care as follows:    []  Hold pending physician visit  []  Discharge    Time In 1615   Time Out 1700   Timed Code Minutes: 45 min   Total Treatment Time: 45 min        Electronically Signed by: Niesha DAILEY/VAL, CHT #[id number]

## 2022-03-14 ENCOUNTER — HOSPITAL ENCOUNTER (OUTPATIENT)
Dept: OCCUPATIONAL THERAPY | Age: 57
Setting detail: THERAPIES SERIES
Discharge: HOME | End: 2022-03-14
Payer: COMMERCIAL

## 2022-03-14 PROCEDURE — 97110 THERAPEUTIC EXERCISES: CPT

## 2022-03-14 NOTE — PROGRESS NOTES
[address]  [] EVALUATION  [x] DAILY NOTE (LAND) [] DAILY NOTE (AQUATIC ) [] PROGRESS NOTE [] DISCHARGE NOTE    [x] OUTPATIENT REHABILITATION CENTER [facility]   [] [place] [address]    [] [facility]   [] Anjana Drummond    Date: 3/14/2022  Patient Name:  Bina Vital  : 1965  MRN: [id number]  CSN: [id number]    Referring Practitioner Joshua Hatfield MD   Diagnosis ORIF LEFT     Treatment Diagnosis ORIF Left humerus, decreased ROM, decreased strength   Date of Evaluation 22      Functional Outcome Measure Used UEFI   Functional Outcome Score 32/80 (22)       Insurance: Primary: Payor: Renea Guzman /  /  / ,   Secondary:    Authorization Information:   PRE CERTIFICATION REQUIRED: NO   INSURANCE THERAPY BENEFIT:  60 VISITS PT/OT/ST COMBINED PER CALENDAR YEAR   AQUATIC THERAPY COVERED: YES  MODALITIES COVERED:  YES   Visit # 7, 7/10 for progress note   Visits Allowed: 60 visits    Recertification Date:    Physician Follow-Up: 22   Physician Orders: OT eval and treat . Pertinent History: PT fell from a horse on 10-23-21, underwent an ORIF of the proximal humerus on 10-28-21. Was off work until last week. SUBJECTIVE:  Patient reports doing well, still waiting on receiving her bone stimulator.       TREATMENT   Precautions: Fracture not fully healed, patient to receive bone stimulator No lifting >  50 #,    Pain:  2/10    X in shaded column indicates Activity Completed Today   Modalities Parameters/  Location  Notes/Comments   MHP to L shoulder x 10 min stretching into ER and IR while on heat, completed in standing  X                Manual Therapy Time/  Technique  Notes/Comments   PROM L shoulder all motions to tolerance  x    sidelying GH mobs and scapular mobilizations   x Supine GH mobs only this date    IASTM L shoulder including deltoid, RC, upper trap, bicep, pec minor      Exercises   Sets/  Sec Reps Notes/Comments   Table slide sh flexion to warm up, scap retraction 1 10 ea     Supine dowel adelina, sh flex, ABD, ER at side 1 10 ea     Pectoralis stretch in corner hands at 60 degrees and at 90 degrees for pec major  15 sec 5 ea  Good stretch noted    Posterior capsule stretch  15 sec 4  Overpressure from therapist    sidelying ER AROM towel roll  1 10 X Initiated. Fair ROM noted    Venezuelan ball wall walks  1 7 X    pulley 1 10 X    Supine AROM chest press, circles cw/ccw 1 15 ea X    Supine AROM flexion bilaterally 1 10 X    Supine ER/IR hands behind head 1 10     Wall slides using towel 1 10 X                  Activities Time    Notes/Comments                       Specific Interventions Next Treatment: MHP with PROM ex, AAROM, AROM, progressive strengthening as able     Activity/Treatment Tolerance:  [x]  Patient tolerated treatment well  []  Patient limited by fatigue  []  Patient limited by pain   []  Patient limited by other medical complications  []  Other:     Assessment: Patient is progressing toward goals. Areas for Improvement: impaired activity tolerance, impaired ROM, impaired strength and pain  Prognosis: good    GOALS:  Patient Goal: I want to be able to move my arm again without pain. Short Term Goals:  Time Frame: 10 sessions. *  Patient will increase left shoulder AROM greater than 85degrees flexion, 80 degrees abduction, to middle of waistband for IR and 50 degrees ER with the arm at the side to increase ease and ability to put on a shirt. *  Patient will increase left shoulder PROM greater than 95 degrees flexion, 85 degrees abduction, 65 degrees IR and 50 degrees ER in abduction to increase ease and ability to wash and style hair. *  Pt. will report decreased pain with left shoulder to no greater than 3/10 for ability to ease for donning a bra  *  Patient will demonstrate I knowledge of HEP for improved flexibility and strength for lifting.     Long Term Goals:  Time Frame: 8 weeks *  Pt. will demo 4+/5 MMT right shoulder for independence with exercise routine   *  Patient will improve UEFS to at least 60/80  * Patient will demonstrate ability to reach overhead for an object with affected extremity without increased pain. Patient Education:    []  HEP/Education Completed: Plan of Care, Goals,    [address] Access Code for HEP: Access Code: [facility]   Standing Scapular Retraction - 2-3 x daily - 7 x weekly - 1 sets - 10 reps   Supine Shoulder Flexion Extension AAROM with Dowel - 2 x daily - 7 x weekly - 1 sets - 10 reps   Seated Shoulder Flexion Towel Slide at Table Top - 2-3 x daily - 7 x weekly - 1 sets - 10 reps   Supine Shoulder External Rotation in 45 Degrees Abduction AAROM with Dowel - 2 x daily - 7 x weekly - 1 sets - 10 reps   Standing Shoulder Posterior Capsule Stretch - 2-3 x daily - 7 x weekly - 1 sets - 10 reps   Supine Shoulder Abduction AAROM with Dowel - 2 x daily - 7 x weekly - 1 sets - 10 reps   Standing Bilateral Shoulder Internal Rotation AAROM with Dowel - 2 x daily - 7 x weekly - 1 sets - 10 reps   Doorway Pec Stretch at 60 Degrees Abduction with Arm Straight - 2-3 x daily - 7 x weekly - 1 sets - 10 reps   NEW 2/21/22: Sidelying ER AROM, wall walks, corner pec major stretch   [x]  No new Education completed  []  Reviewed Prior HEP      []  Patient verbalized and/or demonstrated understanding of education provided. []  Patient unable to verbalize and/or demonstrate understanding of education provided. Will continue education. [x]  Barriers to learning: none    PLAN:  Treatment Recommendations: Strengthening, Range of Motion, Manual Therapy - Soft Tissue Mobilization, Manual Therapy - Joint Manipulation, Pain Management, Home Exercise Program, Patient Education, Safety Education and Training and Modalities    []  Plan of care initiated. Plan to see patient 2 times per week for 8 weeks to address the treatment planned outlined above.   [x]  Continue with current plan of care  []  Modify plan of care as follows:    []  Hold pending physician visit  []  Discharge    Time In 1645   Time Out 1730   Timed Code Minutes: 45 min   Total Treatment Time: 45 min        Electronically Signed by: Dario GRANADO, T #[id number]

## 2022-03-17 ENCOUNTER — APPOINTMENT (OUTPATIENT)
Dept: OCCUPATIONAL THERAPY | Age: 57
End: 2022-03-17
Payer: COMMERCIAL

## 2022-03-18 ENCOUNTER — HOSPITAL ENCOUNTER (OUTPATIENT)
Dept: OCCUPATIONAL THERAPY | Age: 57
Setting detail: THERAPIES SERIES
Discharge: HOME | End: 2022-03-18
Payer: COMMERCIAL

## 2022-03-18 PROCEDURE — 97110 THERAPEUTIC EXERCISES: CPT

## 2022-03-18 NOTE — PROGRESS NOTES
[address]  [] EVALUATION  [x] DAILY NOTE (LAND) [] DAILY NOTE (AQUATIC ) [] PROGRESS NOTE [] DISCHARGE NOTE    [x] OUTPATIENT REHABILITATION CENTER [facility]   [] [place] [address]    [] [facility]   [] Hali Goodman    Date: 3/18/2022  Patient Name:  Nolan Peter  : 1965  MRN: [id number]  CSN: [id number]    Referring Practitioner Jayson Denise MD   Diagnosis ORIF LEFT     Treatment Diagnosis ORIF Left humerus, decreased ROM, decreased strength   Date of Evaluation 22      Functional Outcome Measure Used UEFI   Functional Outcome Score 32/80 (22)       Insurance: Primary: Payor: Nirmala Montoya /  /  / ,   Secondary:    Authorization Information:   PRE CERTIFICATION REQUIRED: NO   INSURANCE THERAPY BENEFIT:  60 VISITS PT/OT/ST COMBINED PER CALENDAR YEAR   AQUATIC THERAPY COVERED: YES  MODALITIES COVERED:  YES   Visit # 8, 8/10 for progress note   Visits Allowed: 60 visits    Recertification Date:    Physician Follow-Up: 22   Physician Orders: OT eval and treat . Pertinent History: PT fell from a horse on 10-23-21, underwent an ORIF of the proximal humerus on 10-28-21. Was off work until last week. SUBJECTIVE:  Patient reports a little more discomfort in left shoulder today due to activities at work yesterday.  Pt. 10 minutes late for appointment today      TREATMENT   Precautions: Fracture not fully healed, patient to receive bone stimulator No lifting >  50 #,    Pain:  3/10    X in shaded column indicates Activity Completed Today   Modalities Parameters/  Location  Notes/Comments   MHP to L shoulder x 10 min stretching into ER and IR while on heat, completed in standing   Xx Using dowel for ER and towel for IR stretching               Manual Therapy Time/  Technique  Notes/Comments   PROM L shoulder all motions to tolerance  Xx    GH mobs   Xx    IASTM L shoulder including deltoid, RC, upper trap, bicep, pec minor      Exercises   Sets/  Sec Reps  Notes/Comments   Table slide sh flexion to warm up, scap retraction 1 10 ea     Supine dowel adelina, sh flex, ABD, ER at side 1 10 ea     Pectoralis stretch in corner hands at 60 degrees and at 90 degrees for pec major  15 sec 5 ea  Good stretch noted    Posterior capsule stretch  15 sec 4  Overpressure from therapist    sidelying ER AROM towel roll  1 10 Xx Fair ROM noted    Portuguese ball wall walks  1 7     pulley 1 10 Xx    Supine AROM chest press, circles cw/ccw 1 15 ea Xx    Supine AROM flexion bilaterally 1 10 Xx    Supine ER/IR hands behind head 1 10     Wall slides using towel 1 10 Xx                  Activities Time    Notes/Comments                       Specific Interventions Next Treatment: MHP with PROM ex, AAROM, AROM, progressive strengthening as able     Activity/Treatment Tolerance:  [x]  Patient tolerated treatment well  []  Patient limited by fatigue  []  Patient limited by pain   []  Patient limited by other medical complications  []  Other:     Assessment: Patient is progressing toward goals. Areas for Improvement: impaired activity tolerance, impaired ROM, impaired strength and pain  Prognosis: good    GOALS:  Patient Goal: I want to be able to move my arm again without pain. Short Term Goals:  Time Frame: 10 sessions. *  Patient will increase left shoulder AROM greater than 85degrees flexion, 80 degrees abduction, to middle of waistband for IR and 50 degrees ER with the arm at the side to increase ease and ability to put on a shirt. *  Patient will increase left shoulder PROM greater than 95 degrees flexion, 85 degrees abduction, 65 degrees IR and 50 degrees ER in abduction to increase ease and ability to wash and style hair.     *  Pt. will report decreased pain with left shoulder to no greater than 3/10 for ability to ease for donning a bra  *  Patient will demonstrate I knowledge of HEP for improved flexibility and strength for lifting. Long Term Goals:  Time Frame: 8 weeks   *  Pt. will demo 4+/5 MMT right shoulder for independence with exercise routine   *  Patient will improve UEFS to at least 60/80  * Patient will demonstrate ability to reach overhead for an object with affected extremity without increased pain. Patient Education:    []  HEP/Education Completed: Plan of Care, Goals,    [address] Access Code for HEP: Access Code: [facility]   Standing Scapular Retraction - 2-3 x daily - 7 x weekly - 1 sets - 10 reps   Supine Shoulder Flexion Extension AAROM with Dowel - 2 x daily - 7 x weekly - 1 sets - 10 reps   Seated Shoulder Flexion Towel Slide at Table Top - 2-3 x daily - 7 x weekly - 1 sets - 10 reps   Supine Shoulder External Rotation in 45 Degrees Abduction AAROM with Dowel - 2 x daily - 7 x weekly - 1 sets - 10 reps   Standing Shoulder Posterior Capsule Stretch - 2-3 x daily - 7 x weekly - 1 sets - 10 reps   Supine Shoulder Abduction AAROM with Dowel - 2 x daily - 7 x weekly - 1 sets - 10 reps   Standing Bilateral Shoulder Internal Rotation AAROM with Dowel - 2 x daily - 7 x weekly - 1 sets - 10 reps   Doorway Pec Stretch at 60 Degrees Abduction with Arm Straight - 2-3 x daily - 7 x weekly - 1 sets - 10 reps   NEW 2/21/22: Sidelying ER AROM, wall walks, corner pec major stretch   [x]  No new Education completed  []  Reviewed Prior HEP      []  Patient verbalized and/or demonstrated understanding of education provided. []  Patient unable to verbalize and/or demonstrate understanding of education provided. Will continue education. [x]  Barriers to learning: none    PLAN:  Treatment Recommendations: Strengthening, Range of Motion, Manual Therapy - Soft Tissue Mobilization, Manual Therapy - Joint Manipulation, Pain Management, Home Exercise Program, Patient Education, Safety Education and Training and Modalities    []  Plan of care initiated.   Plan to see patient 2 times per week for 8 weeks to address the treatment planned outlined above.   [x]  Continue with current plan of care  []  Modify plan of care as follows:    []  Hold pending physician visit  []  Discharge    Time In 1010   Time Out 1048   Timed Code Minutes: 38 min   Total Treatment Time: 38 min        Electronically Signed by:  ASHLIE Rivas/VAL [id number]

## 2022-03-21 ENCOUNTER — HOSPITAL ENCOUNTER (OUTPATIENT)
Dept: OCCUPATIONAL THERAPY | Age: 57
Setting detail: THERAPIES SERIES
Discharge: HOME | End: 2022-03-21
Payer: COMMERCIAL

## 2022-03-21 PROCEDURE — 97110 THERAPEUTIC EXERCISES: CPT

## 2022-03-21 NOTE — PROGRESS NOTES
[address]  [] EVALUATION  [x] DAILY NOTE (LAND) [] DAILY NOTE (AQUATIC ) [] PROGRESS NOTE [] DISCHARGE NOTE    [x] OUTPATIENT REHABILITATION CENTER [facility]   [] [place] [address]    [] [facility]   [] [place]    Date: 3/21/2022  Patient Name:  Michael Victoria  : 1965  MRN: [id number]  CSN: [id number]    Referring Practitioner Aurora Contreras MD   Diagnosis ORIF LEFT     Treatment Diagnosis ORIF Left humerus, decreased ROM, decreased strength   Date of Evaluation 22      Functional Outcome Measure Used UEFI   Functional Outcome Score 32/80 (22)       Insurance: Primary: Payor: Mariah Palmer /  /  / ,   Secondary:    Authorization Information:   PRE CERTIFICATION REQUIRED: NO   INSURANCE THERAPY BENEFIT:  60 VISITS PT/OT/ST COMBINED PER CALENDAR YEAR   AQUATIC THERAPY COVERED: YES  MODALITIES COVERED:  YES   Visit # 9, 9/10 for progress note   Visits Allowed: 60 visits    Recertification Date:    Physician Follow-Up: 22   Physician Orders: OT eval and treat . Pertinent History: PT fell from a horse on 10-23-21, underwent an ORIF of the proximal humerus on 10-28-21. Was off work until last week. SUBJECTIVE:  Patient reports a continued discomfort in left shoulder today . Still hasn't gotten the bone stimulator . Working on paperwork for coverage .       TREATMENT   Precautions: Fracture not fully healed, patient to receive bone stimulator No lifting >  50 #,    Pain:  3/10    X in shaded column indicates Activity Completed Today   Modalities Parameters/  Location  Notes/Comments   MHP to L shoulder x 10 min stretching into ER and IR while on heat, completed in supine  x                Manual Therapy Time/  Technique  Notes/Comments   PROM L shoulder all motions to tolerance  X    GH mobs   X    IASTM L shoulder including deltoid, RC, upper trap, bicep, pec minor, supscapularis   x    Exercises   Sets/  Sec Reps  Notes/Comments   Table slide sh flexion to warm up, scap retraction 1 10 ea     Supine dowel adelina, sh flex, ABD, ER at side 1 10 ea x    Pectoralis stretch in corner hands at 60 degrees and at 90 degrees for pec major  15 sec 10 ea x Good stretch noted    Posterior capsule stretch  15 sec 4  Overpressure from therapist    sidelying ER AROM towel roll  1 10 Xx Fair ROM noted    Chadian ball wall walks  1 7     pulley 2 10 X    Supine AROM chest press, circles cw/ccw 1 15 ea X Initiated 1 # wt this date   Supine AROM flexion bilaterally 1 10     Supine ER/IR hands behind head 1 10 x    Wall slides using towel 1 10 X                  Activities Time    Notes/Comments                       Specific Interventions Next Treatment: MHP with PROM ex, AAROM, AROM, progressive strengthening as able     Activity/Treatment Tolerance:  [x]  Patient tolerated treatment well  []  Patient limited by fatigue  []  Patient limited by pain   []  Patient limited by other medical complications  []  Other:     Assessment: Patient is progressing toward goals. Pt is tolerating increased stretching . Initiated light strengthing posterior capsule with good tolerance     Areas for Improvement: impaired activity tolerance, impaired ROM, impaired strength and pain  Prognosis: good    GOALS:  Patient Goal: I want to be able to move my arm again without pain. Short Term Goals:  Time Frame: 10 sessions. *  Patient will increase left shoulder AROM greater than 85degrees flexion, 80 degrees abduction, to middle of waistband for IR and 50 degrees ER with the arm at the side to increase ease and ability to put on a shirt. *  Patient will increase left shoulder PROM greater than 95 degrees flexion, 85 degrees abduction, 65 degrees IR and 50 degrees ER in abduction to increase ease and ability to wash and style hair.     *  Pt. will report decreased pain with left shoulder to no greater than 3/10 for ability to ease for donning a bra  *  Patient will demonstrate I knowledge of HEP for improved flexibility and strength for lifting. Long Term Goals:  Time Frame: 8 weeks   *  Pt. will demo 4+/5 MMT right shoulder for independence with exercise routine   *  Patient will improve UEFS to at least 60/80  * Patient will demonstrate ability to reach overhead for an object with affected extremity without increased pain. Patient Education:   []  HEP/Education Completed: Plan of Care, Goals,   [address] Access Code for HEP: Access Code: [facility]  Standing Scapular Retraction - 2-3 x daily - 7 x weekly - 1 sets - 10 reps  Supine Shoulder Flexion Extension AAROM with Dowel - 2 x daily - 7 x weekly - 1 sets - 10 reps  Seated Shoulder Flexion Towel Slide at Table Top - 2-3 x daily - 7 x weekly - 1 sets - 10 reps  Supine Shoulder External Rotation in 45 Degrees Abduction AAROM with Dowel - 2 x daily - 7 x weekly - 1 sets - 10 reps  Standing Shoulder Posterior Capsule Stretch - 2-3 x daily - 7 x weekly - 1 sets - 10 reps  Supine Shoulder Abduction AAROM with Dowel - 2 x daily - 7 x weekly - 1 sets - 10 reps  Standing Bilateral Shoulder Internal Rotation AAROM with Dowel - 2 x daily - 7 x weekly - 1 sets - 10 reps  Doorway Pec Stretch at 60 Degrees Abduction with Arm Straight - 2-3 x daily - 7 x weekly - 1 sets - 10 reps  NEW 2/21/22: Sidelying ER AROM, wall walks, corner pec major stretch   [x]  No new Education completed  []  Reviewed Prior HEP      []  Patient verbalized and/or demonstrated understanding of education provided. []  Patient unable to verbalize and/or demonstrate understanding of education provided. Will continue education. [x]  Barriers to learning: none    PLAN:  Treatment Recommendations: Strengthening, Range of Motion, Manual Therapy - Soft Tissue Mobilization, Manual Therapy - Joint Manipulation, Pain Management, Home Exercise Program, Patient Education, Safety Education and Training and Modalities    []  Plan of care initiated.   Plan to see patient 2 times per week for 8 weeks to address the treatment planned outlined above.   [x]  Continue with current plan of care  []  Modify plan of care as follows:    []  Hold pending physician visit  []  Discharge    Time In 1425   Time Out 1510   Timed Code Minutes: 45 min   Total Treatment Time: 45 min        Electronically Signed by:  MICHAELA Partida OTR/L [id number]

## 2022-03-24 ENCOUNTER — HOSPITAL ENCOUNTER (OUTPATIENT)
Dept: OCCUPATIONAL THERAPY | Age: 57
Setting detail: THERAPIES SERIES
Discharge: HOME | End: 2022-03-24
Payer: COMMERCIAL

## 2022-03-24 PROCEDURE — 97110 THERAPEUTIC EXERCISES: CPT

## 2022-03-24 PROCEDURE — 97140 MANUAL THERAPY 1/> REGIONS: CPT

## 2022-03-24 NOTE — PROGRESS NOTES
[address]  [] EVALUATION  [] DAILY NOTE (LAND) [] DAILY NOTE (AQUATIC ) [x] PROGRESS NOTE [] DISCHARGE NOTE    [x] OUTPATIENT REHABILITATION CENTER [facility]   [] [place] [address]    [] [facility]   [] Brandon Slate    Date: 3/24/2022  Patient Name:  Eddi Duran  : 1965  MRN: [id number]  CSN: [id number]    Referring Practitioner Selin Duron MD   Diagnosis ORIF LEFT     Treatment Diagnosis ORIF Left humerus, decreased ROM, decreased strength   Date of Evaluation 22      Functional Outcome Measure Used UEFI   Functional Outcome Score 32/80 (22)       Insurance: Primary: Payor: Loida Mercado /  /  / ,   Secondary:    Authorization Information:   PRE CERTIFICATION REQUIRED: NO   INSURANCE THERAPY BENEFIT:  60 VISITS PT/OT/ST COMBINED PER CALENDAR YEAR   AQUATIC THERAPY COVERED: YES  MODALITIES COVERED:  YES   Visit # 10, (PN 3/24/22)   Visits Allowed: 60 visits    Recertification Date:    Physician Follow-Up: 22   Physician Orders: OT eval and treat . Pertinent History: PT fell from a horse on 10-23-21, underwent an ORIF of the proximal humerus on 10-28-21. Was off work until last week. SUBJECTIVE:  Patient reports got her bone stimulator today. Pt. States her left shoulder feels stiff today. Pt. States overall though, her ROM is improving.        TREATMENT   Precautions: Fracture not fully healed, patient to receive bone stimulator No lifting >  50 #,    Pain:  3/10    X in shaded column indicates Activity Completed Today   Modalities Parameters/  Location  Notes/Comments   MHP to L shoulder x 10 min stretching into ER and IR while on heat; ER by therapist, IR by patient using towel  Xx                Manual Therapy Time/  Technique  Notes/Comments   PROM L shoulder all motions to tolerance  Xx    GH mobs; subscap stretch  Xx    IASTM L shoulder including deltoid, RC, upper trap, bicep, pec minor, supscapularis       Exercises   Sets/  Sec Reps  Notes/Comments   Table slide sh flexion to warm up, scap retraction 1 10 ea     Supine dowel adelina, sh flex, ABD, ER at side 1 10 ea Xx    Pectoralis stretch in corner hands at 60 degrees and at 90 degrees for pec major  15 sec 10 ea  Good stretch noted    Posterior capsule stretch  15 sec 4  Overpressure from therapist    sidelying ER AROM towel roll  1 10  Fair ROM noted    Ugandan ball wall walks  1 7     pulley 2 10     Supine AROM chest press, circles cw/ccw 1 15 ea  Initiated 1 # wt this date   Supine AROM flexion bilaterally 1 10     Supine ER/IR hands behind head 1 10     Wall slides using towel 1 10 Xx                  Activities Time    Notes/Comments                       Specific Interventions Next Treatment: MHP with PROM ex, AAROM, AROM, progressive strengthening as able     Activity/Treatment Tolerance:  [x]  Patient tolerated treatment well  []  Patient limited by fatigue  []  Patient limited by pain   []  Patient limited by other medical complications  []  Other:     Assessment: Patient has been seen x10 visits since her eval on 2/17/21 for LUE after ORIF of humerus in October 2021. Pt. States she is able to pull her pants up now using LUE, able to use left UE to reach behind her head better for washing her hair. Pt. States he is now has enough AROM to be able to hold LUE up in order to fold large sheets however arm fatigues quickly. Pt. States left arm is still too weak to get items with weight out of overhead cupboard and pt. Still not able to get hand up and back far enough to put hair in ponytail. AROM & PROM of left shoulder has improved significantly from eval. Shoulder remains weak. Will continue with POC for progression of ROM and functional strength. Areas for Improvement: impaired activity tolerance, impaired ROM, impaired strength and pain  Prognosis: good    GOALS:  Patient Goal: I want to be able to move my arm again without pain.     Short Term Goals:  Time Frame: 10 sessions. *  Patient will increase left shoulder AROM greater than 85degrees flexion, 80 degrees abduction, to middle of waistband for IR and 50 degrees ER with the arm at the side to increase ease and ability to put on a shirt.  - GOAL MET 3/24/22 WITH ACTIVE LEFT SHOULDER FLEXION= 118, ABD= 101, IR ABLE TO GET THUMB TO WAISTBAND BEHIND BACK, AND ER= 50  Revised Goal: Pt. Will demo improved AROM left shoulder flexion= 130, abd= 115, IR thumb 1\" above waistband behind back, and ER= 60 for ease with putting hair into ponytail    *  Patient will increase left shoulder PROM greater than 95 degrees flexion, 85 degrees abduction, 65 degrees IR and 50 degrees ER in abduction to increase ease and ability to wash and style hair.   - GOAL MET 3/24/22 WITH PASSIVE LEFT SHOULDER FLEXION= 127, ABD= 150, IR IN ABDUCTION= 70, AND ER IN ABDUCTION= 55  Revised Goal: Pt. Will demo improved PROM left shoulder to flexion= 145, abd= 160, and ER= 65 in abduction for facilitation of AROM     *  Pt. will report decreased pain with left shoulder to no greater than 3/10 for ability to ease for donning a bra - GOAL MET 3/24/22 - PT. STATES SHE CAN TELL WHEN HER SHOULDER IS GETTING TIRED AND TIGHT AND SO SHE WILL REST HER ARM BEFORE THE PAIN GETS TOO HIGH.     *  Patient will demonstrate I knowledge of HEP for improved flexibility and strength for lifting. - GOAL MET 3/24/21 WITH PT. INDEPENDENT WITH STRETCHES FOR LEFT SHOULDER  Continue with upgrades for strengthening    Long Term Goals:  Time Frame: 8 weeks   *  Pt. will demo 4+/5 MMT right shoulder for independence with exercise routine - GOAL NOT MET AND CONTINUE 3/24/22     *  Patient will improve UEFS to at least 60/80 -GOAL NOT MET 3/24/22 WITH SCORE OF 47/80   CONTINUE GOAL     * Patient will demonstrate ability to reach overhead for an object with affected extremity without increased pain - GOAL MET 3/24/22 - PT. STATES SHE FEELS THAT REACHING WITHOUT PAIN IS GOING WELL, BUT SHE WOULD NOT HAVE THE STRENGTH TO GET SOMETHING WITH WEIGHT FROM OVERHEAD  Revised Goal: Pt. Will demo improved strength LUE to be able to get dishes out of an overhead cupboard with LUE. Patient Education:    []  HEP/Education Completed: Plan of Care, Goals,    [address] Access Code for HEP: Access Code: [facility]   Standing Scapular Retraction - 2-3 x daily - 7 x weekly - 1 sets - 10 reps   Supine Shoulder Flexion Extension AAROM with Dowel - 2 x daily - 7 x weekly - 1 sets - 10 reps   Seated Shoulder Flexion Towel Slide at Table Top - 2-3 x daily - 7 x weekly - 1 sets - 10 reps   Supine Shoulder External Rotation in 45 Degrees Abduction AAROM with Dowel - 2 x daily - 7 x weekly - 1 sets - 10 reps   Standing Shoulder Posterior Capsule Stretch - 2-3 x daily - 7 x weekly - 1 sets - 10 reps   Supine Shoulder Abduction AAROM with Dowel - 2 x daily - 7 x weekly - 1 sets - 10 reps   Standing Bilateral Shoulder Internal Rotation AAROM with Dowel - 2 x daily - 7 x weekly - 1 sets - 10 reps   Doorway Pec Stretch at 60 Degrees Abduction with Arm Straight - 2-3 x daily - 7 x weekly - 1 sets - 10 reps   NEW 2/21/22: Sidelying ER AROM, wall walks, corner pec major stretch   []  No new Education completed  [x]  Reviewed today's ROM measurements and discussed goals/POC      [x]  Patient verbalized and/or demonstrated understanding of education provided. []  Patient unable to verbalize and/or demonstrate understanding of education provided. Will continue education. [x]  Barriers to learning: none    PLAN:  Treatment Recommendations: Strengthening, Range of Motion, Manual Therapy - Soft Tissue Mobilization, Manual Therapy - Joint Manipulation, Pain Management, Home Exercise Program, Patient Education, Safety Education and Training and Modalities    []  Plan of care initiated. Plan to see patient 2 times per week for 8 weeks to address the treatment planned outlined above.   [x]  Continue with current plan of care  []  Modify plan of care as follows:    []  Hold pending physician visit  []  Discharge    Time In 1615   Time Out 1715   Timed Code Minutes: 60 min   Total Treatment Time: 60 min        Electronically Signed by:  Ronny Jackson OTR/L [id number]

## 2022-03-28 ENCOUNTER — HOSPITAL ENCOUNTER (OUTPATIENT)
Dept: OCCUPATIONAL THERAPY | Age: 57
Setting detail: THERAPIES SERIES
Discharge: HOME | End: 2022-03-28
Payer: COMMERCIAL

## 2022-03-28 PROCEDURE — 97110 THERAPEUTIC EXERCISES: CPT

## 2022-03-28 NOTE — PROGRESS NOTES
[address] THERAPY  [] EVALUATION  [x] DAILY NOTE (LAND) [] DAILY NOTE (AQUATIC ) [] PROGRESS NOTE [] DISCHARGE NOTE    [x] OUTPATIENT REHABILITATION CENTER [facility]   [] [place] [address]    [] [facility]   [] Darrion Kline    Date: 3/28/2022  Patient Name:  José Patel  : 1965  MRN: [id number]  CSN: [id number]    Referring Practitioner Soy Benitez MD   Diagnosis ORIF LEFT     Treatment Diagnosis ORIF Left humerus, decreased ROM, decreased strength   Date of Evaluation 22      Functional Outcome Measure Used UEFI   Functional Outcome Score 32/80 (22)       Insurance: Primary: Payor: [employer] /  /  / ,   Secondary:    Authorization Information:   PRE CERTIFICATION REQUIRED: NO   INSURANCE THERAPY BENEFIT:  60 VISITS PT/OT/ST COMBINED PER CALENDAR YEAR   AQUATIC THERAPY COVERED: YES  MODALITIES COVERED:  YES   Visit # 11, 1/10 (PN 3/24/22)   Visits Allowed: 60 visits    Recertification Date: 46   Physician Follow-Up: 22   Physician Orders: OT eval and treat . Pertinent History: PT fell from a horse on 10-23-21, underwent an ORIF of the proximal humerus on 10-28-21. Was off work until last week.       SUBJECTIVE:        TREATMENT   Precautions: Fracture not fully healed, patient to receive bone stimulator No lifting >  50 #,    Pain:  1-2/10    X in shaded column indicates Activity Completed Today   Modalities Parameters/  Location  Notes/Comments   MHP to L shoulder x 10 min stretching into ER and IR while on heat; ER by therapist, IR by patient using towel                  Manual Therapy Time/  Technique  Notes/Comments   PROM L shoulder all motions to tolerance  X    GH mobs; subscap stretch      IASTM L shoulder including deltoid, RC, upper trap, bicep, pec minor, supscapularis       Exercises   Sets/  Sec Reps  Notes/Comments   Table slide sh flexion to warm up, scap retraction 1 10 ea     Supine dowel adelina, sh flex, ABD, ER at side 1 10 ea     Pectoralis stretch in corner hands at 60 degrees and at 90 degrees for pec major  15 sec 10 ea  Good stretch noted    Posterior capsule stretch  15 sec 4  Overpressure from therapist    sidelying ER AROM towel roll  1 10  Fair ROM noted    [place]ian ball wall walks  1 7     pulley 2 10     Supine AROM chest press, circles cw/ccw 1 15 ea X 1#   Supine AROM flexion bilaterally 1 10     Supine ER/IR hands behind head 1 10     Wall slides using towel 1 10     Supine horiz abd, diagonals 1 10 ea X    Standing tricep, rows, bicep 1 10 ea X Green band   mingo 1 10 ea X Orange band, towel roll under arm for IR/ER                 Activities Time    Notes/Comments                       Specific Interventions Next Treatment: MHP with PROM ex, AAROM, AROM, progressive strengthening as able     Activity/Treatment Tolerance:  [x]  Patient tolerated treatment well  []  Patient limited by fatigue  []  Patient limited by pain   []  Patient limited by other medical complications  []  Other:     Assessment: Patient progressing toward goals. Strengthening initiated today with good tolerance. Areas for Improvement: impaired activity tolerance, impaired ROM, impaired strength and pain  Prognosis: good    GOALS:  Patient Goal: I want to be able to move my arm again without pain. Short Term Goals:  Time Frame: 10 sessions. *  Patient will increase left shoulder AROM greater than 85degrees flexion, 80 degrees abduction, to middle of waistband for IR and 50 degrees ER with the arm at the side to increase ease and ability to put on a shirt.  - GOAL MET 3/24/22 WITH ACTIVE LEFT SHOULDER FLEXION= 118, ABD= 101, IR ABLE TO GET THUMB TO WAISTBAND BEHIND BACK, AND ER= 50  Revised Goal: Pt.  Will demo improved AROM left shoulder flexion= 130, abd= 115, IR thumb 1\" above waistband behind back, and ER= 60 for ease with putting hair into ponytail    *  Patient will increase left shoulder PROM greater than 95 degrees flexion, 85 degrees abduction, 65 degrees IR and 50 degrees ER in abduction to increase ease and ability to wash and style hair.   - GOAL MET 3/24/22 WITH PASSIVE LEFT SHOULDER FLEXION= 127, ABD= 150, IR IN ABDUCTION= 70, AND ER IN ABDUCTION= 55  Revised Goal: Pt. Will demo improved PROM left shoulder to flexion= 145, abd= 160, and ER= 65 in abduction for facilitation of AROM     *  Pt. will report decreased pain with left shoulder to no greater than 3/10 for ability to ease for donning a bra - GOAL MET 3/24/22 - PT. STATES SHE CAN TELL WHEN HER SHOULDER IS GETTING TIRED AND TIGHT AND SO SHE WILL REST HER ARM BEFORE THE PAIN GETS TOO HIGH. *  Patient will demonstrate I knowledge of HEP for improved flexibility and strength for lifting. - GOAL MET 3/24/21 WITH PT. INDEPENDENT WITH STRETCHES FOR LEFT SHOULDER  Continue with upgrades for strengthening    Long Term Goals:  Time Frame: 8 weeks   *  Pt. will demo 4+/5 MMT right shoulder for independence with exercise routine - GOAL NOT MET AND CONTINUE 3/24/22     *  Patient will improve UEFS to at least 60/80 -GOAL NOT MET 3/24/22 WITH SCORE OF 47/80   CONTINUE GOAL     * Patient will demonstrate ability to reach overhead for an object with affected extremity without increased pain - GOAL MET 3/24/22 - PT. STATES SHE FEELS THAT REACHING WITHOUT PAIN IS GOING WELL, BUT SHE WOULD NOT HAVE THE STRENGTH TO GET SOMETHING WITH WEIGHT FROM OVERHEAD  Revised Goal: Pt. Will demo improved strength LUE to be able to get dishes out of an overhead cupboard with LUE.     Patient Education:    []  HEP/Education Completed: Plan of Care, Goals,    Elner Moses Access Code for HEP: Access Code: [facility]   Standing Scapular Retraction - 2-3 x daily - 7 x weekly - 1 sets - 10 reps   Supine Shoulder Flexion Extension AAROM with Dowel - 2 x daily - 7 x weekly - 1 sets - 10 reps   Seated Shoulder Flexion Towel Slide at Table Top - 2-3 x daily - 7 x weekly - 1 sets - 10 reps   Supine Shoulder External Rotation in 45 Degrees Abduction AAROM with Dowel - 2 x daily - 7 x weekly - 1 sets - 10 reps   Standing Shoulder Posterior Capsule Stretch - 2-3 x daily - 7 x weekly - 1 sets - 10 reps   Supine Shoulder Abduction AAROM with Dowel - 2 x daily - 7 x weekly - 1 sets - 10 reps   Standing Bilateral Shoulder Internal Rotation AAROM with Dowel - 2 x daily - 7 x weekly - 1 sets - 10 reps   Doorway Pec Stretch at 60 Degrees Abduction with Arm Straight - 2-3 x daily - 7 x weekly - 1 sets - 10 reps   NEW 2/21/22: Sidelying ER AROM, wall walks, corner pec major stretch   []  No new Education completed  [x]  Reviewed today's ROM measurements and discussed goals/POC      [x]  Patient verbalized and/or demonstrated understanding of education provided. []  Patient unable to verbalize and/or demonstrate understanding of education provided. Will continue education. [x]  Barriers to learning: none    PLAN:  Treatment Recommendations: Strengthening, Range of Motion, Manual Therapy - Soft Tissue Mobilization, Manual Therapy - Joint Manipulation, Pain Management, Home Exercise Program, Patient Education, Safety Education and Training and Modalities    []  Plan of care initiated. Plan to see patient 2 times per week for 8 weeks to address the treatment planned outlined above.   [x]  Continue with current plan of care  []  Modify plan of care as follows:    []  Hold pending physician visit  []  Discharge    Time In 1645   Time Out 1730   Timed Code Minutes: 45 min   Total Treatment Time: 45 min        Electronically Signed by:  Alina DAILEY/VAL, CHT #[id number]

## 2022-03-31 ENCOUNTER — HOSPITAL ENCOUNTER (OUTPATIENT)
Dept: OCCUPATIONAL THERAPY | Age: 57
Setting detail: THERAPIES SERIES
Discharge: HOME | End: 2022-03-31
Payer: COMMERCIAL

## 2022-03-31 PROCEDURE — 97110 THERAPEUTIC EXERCISES: CPT

## 2022-03-31 PROCEDURE — 97140 MANUAL THERAPY 1/> REGIONS: CPT

## 2022-03-31 NOTE — PROGRESS NOTES
[address]ER[address]  [] EVALUATION  [x] DAILY NOTE (LAND) [] DAILY NOTE (AQUATIC ) [] PROGRESS NOTE [] DISCHARGE NOTE    [x] OUTPATIENT REHABILITATION CENTER [facility]   [] [place] [address]    [] [facility]   [] Elizabeth Madrid    Date: 3/31/2022  Patient Name:  Samantha James  : 1965  MRN: [id number]  CSN: [id number]    Referring Practitioner Bro Glover MD   Diagnosis ORIF LEFT     Treatment Diagnosis ORIF Left humerus, decreased ROM, decreased strength   Date of Evaluation 22      Functional Outcome Measure Used UEFI   Functional Outcome Score 32/80 (22)       Insurance: Primary: Payor: Kit [employer]roz[employer] /  /  / ,   Secondary:    Authorization Information:   PRE CERTIFICATION REQUIRED: NO   INSURANCE THERAPY BENEFIT:  60 VISITS PT/OT/ST COMBINED PER CALENDAR YEAR   AQUATIC THERAPY COVERED: YES  MODALITIES COVERED:  YES   Visit # 12, 2/10 (PN 3/24/22)   Visits Allowed: 60 visits    Recertification Date: 3-14-38   Physician Follow-Up: 22   Physician Orders: OT eval and treat . Pertinent History: PT fell from a horse on 10-23-21, underwent an ORIF of the proximal humerus on 10-28-21. Was off work until last week. SUBJECTIVE:  Pt. States she has been wearing her bone stimulator.  States she felt good after starting strengthening last session - no excessive soreness      TREATMENT   Precautions: Fracture not fully healed, patient to receive bone stimulator No lifting >  50 #,    Pain:  1-2/10    X in shaded column indicates Activity Completed Today   Modalities Parameters/  Location  Notes/Comments   MHP to L shoulder x 10 min stretching into ER and IR while on heat; ER with dowel and IR with towel stretch  Xx                Manual Therapy Time/  Technique  Notes/Comments   PROM L shoulder all motions to tolerance  Xx    GH mobs; subscap stretch      IASTM L shoulder including deltoid, RC, upper trap, bicep, pec minor, supscapularis       Exercises   Sets/  Sec Reps  Notes/Comments   Table slide sh flexion to warm up, scap retraction 1 10 ea     Supine dowel adelina, sh flex, ABD, ER at side 1 10 ea     Pectoralis stretch in corner hands at 60 degrees and at 90 degrees for pec major  15 sec 10 ea  Good stretch noted    Posterior capsule stretch  15 sec 4  Overpressure from therapist    sidelying ER AROM towel roll  1 10  Fair ROM noted    Ukrainian ball wall walks  1 7     pulley 2 10     Supine AROM chest press, circles cw/ccw 1 15 ea Xx 1#   Supine AROM flexion bilaterally 1 10     Supine ER/IR hands behind head 1 10     Wall slides using towel 1 10     Supine horiz abd, diagonals 1 10 ea Xx Green band   Standing tricep, rows, bicep 1 10 ea Xx Green band   mingo 1 10 ea Xx Orange band, towel roll under arm for IR/ER                 Activities Time    Notes/Comments                       Specific Interventions Next Treatment: MHP with PROM ex, AAROM, AROM, progressive strengthening as able     Activity/Treatment Tolerance:  [x]  Patient tolerated treatment well  []  Patient limited by fatigue  []  Patient limited by pain   []  Patient limited by other medical complications  []  Other:     Assessment: Patient progressing toward goals. Continued with strengthening - shaking noted with theraband exercises due to weakness    Areas for Improvement: impaired activity tolerance, impaired ROM, impaired strength and pain  Prognosis: good    GOALS:  Patient Goal: I want to be able to move my arm again without pain. Short Term Goals:  Time Frame: 10 sessions. *  Patient will increase left shoulder AROM greater than 85degrees flexion, 80 degrees abduction, to middle of waistband for IR and 50 degrees ER with the arm at the side to increase ease and ability to put on a shirt.  - GOAL MET 3/24/22 WITH ACTIVE LEFT SHOULDER FLEXION= 118, ABD= 101, IR ABLE TO GET THUMB TO WAISTBAND BEHIND BACK, AND ER= 50  Revised Goal: Pt.  Will demo improved AROM left shoulder flexion= 130, abd= 115, IR thumb 1\" above waistband behind back, and ER= 60 for ease with putting hair into ponytail    *  Patient will increase left shoulder PROM greater than 95 degrees flexion, 85 degrees abduction, 65 degrees IR and 50 degrees ER in abduction to increase ease and ability to wash and style hair.   - GOAL MET 3/24/22 WITH PASSIVE LEFT SHOULDER FLEXION= 127, ABD= 150, IR IN ABDUCTION= 70, AND ER IN ABDUCTION= 55  Revised Goal: Pt. Will demo improved PROM left shoulder to flexion= 145, abd= 160, and ER= 65 in abduction for facilitation of AROM     *  Pt. will report decreased pain with left shoulder to no greater than 3/10 for ability to ease for donning a bra - GOAL MET 3/24/22 - PT. STATES SHE CAN TELL WHEN HER SHOULDER IS GETTING TIRED AND TIGHT AND SO SHE WILL REST HER ARM BEFORE THE PAIN GETS TOO HIGH. *  Patient will demonstrate I knowledge of HEP for improved flexibility and strength for lifting. - GOAL MET 3/24/21 WITH PT. INDEPENDENT WITH STRETCHES FOR LEFT SHOULDER  Continue with upgrades for strengthening    Long Term Goals:  Time Frame: 8 weeks   *  Pt. will demo 4+/5 MMT right shoulder for independence with exercise routine - GOAL NOT MET AND CONTINUE 3/24/22     *  Patient will improve UEFS to at least 60/80 -GOAL NOT MET 3/24/22 WITH SCORE OF 47/80   CONTINUE GOAL     * Patient will demonstrate ability to reach overhead for an object with affected extremity without increased pain - GOAL MET 3/24/22 - PT. STATES SHE FEELS THAT REACHING WITHOUT PAIN IS GOING WELL, BUT SHE WOULD NOT HAVE THE STRENGTH TO GET SOMETHING WITH WEIGHT FROM OVERHEAD  Revised Goal: Pt. Will demo improved strength LUE to be able to get dishes out of an overhead cupboard with LUE.     Patient Education:    []  HEP/Education Completed: Plan of Care, Goals,    [address] Access Code for HEP: Access Code: [facility]   Standing Scapular Retraction - 2-3 x daily - 7 x weekly - 1 sets - 10 reps   Supine Shoulder Flexion Extension AAROM with Dowel - 2 x daily - 7 x weekly - 1 sets - 10 reps   Seated Shoulder Flexion Towel Slide at Table Top - 2-3 x daily - 7 x weekly - 1 sets - 10 reps   Supine Shoulder External Rotation in 45 Degrees Abduction AAROM with Dowel - 2 x daily - 7 x weekly - 1 sets - 10 reps   Standing Shoulder Posterior Capsule Stretch - 2-3 x daily - 7 x weekly - 1 sets - 10 reps   Supine Shoulder Abduction AAROM with Dowel - 2 x daily - 7 x weekly - 1 sets - 10 reps   Standing Bilateral Shoulder Internal Rotation AAROM with Dowel - 2 x daily - 7 x weekly - 1 sets - 10 reps   Doorway Pec Stretch at 60 Degrees Abduction with Arm Straight - 2-3 x daily - 7 x weekly - 1 sets - 10 reps   NEW 2/21/22: Sidelying ER AROM, wall walks, corner pec major stretch   [x]  No new Education completed  []  Reviewed today's ROM measurements and discussed goals/POC      []  Patient verbalized and/or demonstrated understanding of education provided. []  Patient unable to verbalize and/or demonstrate understanding of education provided. Will continue education. [x]  Barriers to learning: none    PLAN:  Treatment Recommendations: Strengthening, Range of Motion, Manual Therapy - Soft Tissue Mobilization, Manual Therapy - Joint Manipulation, Pain Management, Home Exercise Program, Patient Education, Safety Education and Training and Modalities    []  Plan of care initiated. Plan to see patient 2 times per week for 8 weeks to address the treatment planned outlined above.   [x]  Continue with current plan of care  []  Modify plan of care as follows:    []  Hold pending physician visit  []  Discharge    Time In 1615   Time Out 1700   Timed Code Minutes: 45 min   Total Treatment Time: 45 min        Electronically Signed by:  Mickey Shepard OTR/VAL [id number]

## 2022-04-06 ENCOUNTER — HOSPITAL ENCOUNTER (OUTPATIENT)
Dept: OCCUPATIONAL THERAPY | Age: 57
Setting detail: THERAPIES SERIES
Discharge: HOME | End: 2022-04-06
Payer: COMMERCIAL

## 2022-04-06 PROCEDURE — 97110 THERAPEUTIC EXERCISES: CPT

## 2022-04-06 NOTE — PROGRESS NOTES
[address] THERAPY  [] EVALUATION  [x] DAILY NOTE (LAND) [] DAILY NOTE (AQUATIC ) [] PROGRESS NOTE [] DISCHARGE NOTE    [x] OUTPATIENT REHABILITATION [facility]   [] [place] [address]    [] [facility]   [] Sherrill Jimenezworth    Date: 2022  Patient Name:  Raysa Gray  : 1965  MRN: [id number]  CSN: [id number]    Referring Practitioner Trey Sun MD   Diagnosis ORIF LEFT     Treatment Diagnosis ORIF Left humerus, decreased ROM, decreased strength   Date of Evaluation 22      Functional Outcome Measure Used UEFI   Functional Outcome Score 32/80 (22)       Insurance: Primary: Payor: Nancy Arre[employer] /  /  / ,   Secondary:    Authorization Information:   PRE CERTIFICATION REQUIRED: NO   INSURANCE THERAPY BENEFIT:  60 VISITS PT/OT/ST COMBINED PER CALENDAR YEAR   AQUATIC THERAPY COVERED: YES  MODALITIES COVERED:  YES   Visit # 13, 3/10 (PN 3/24/22)   Visits Allowed: 60 visits    Recertification Date:    Physician Follow-Up: 22   Physician Orders: OT eval and treat . Pertinent History: PT fell from a horse on 10-23-21, underwent an ORIF of the proximal humerus on 10-28-21. Was off work until last week. SUBJECTIVE:  Patient states she is wanting to get stronger.       TREATMENT   Precautions: Fracture not fully healed, patient to receive bone stimulator No lifting >  50 #,    Pain:  1-2/10    X in shaded column indicates Activity Completed Today   Modalities Parameters/  Location  Notes/Comments   MHP to L shoulder x 10 min stretching into ER and IR while on heat; ER with dowel and IR with towel stretch  Xx                Manual Therapy Time/  Technique  Notes/Comments   PROM L shoulder all motions to tolerance  Xx    GH mobs; subscap stretch      IASTM L shoulder including deltoid, RC, upper trap, bicep, pec minor, supscapularis       Exercises   Sets/  Sec Reps  Notes/Comments   Table slide sh flexion to warm up, scap retraction 1 10 ea     Supine dowel adelina, sh flex, ABD, ER at side 1 10 ea     Pectoralis stretch in corner hands at 60 degrees and at 90 degrees for pec major  15 sec 10 ea  Good stretch noted    Posterior capsule stretch  15 sec 4  Overpressure from therapist    sidelying ER AROM towel roll  1 10  Fair ROM noted    Wall slides 1 10 X    pulley 2 10     Supine AROM chest press, circles cw/ccw 1 15 ea  1#   Supine AROM flexion bilaterally 1 10     Supine ER/IR hands behind head 1 10     Wall slides using towel 1 10     Supine horiz abd, diagonals 1 10 ea X Green band   Standing tricep, rows, bicep 1 20 ea X Green band   mingo 1 10 ea X Orange band, towel roll under arm for IR/ER                 Activities Time    Notes/Comments                       Specific Interventions Next Treatment: MHP with PROM ex, AAROM, AROM, progressive strengthening as able     Activity/Treatment Tolerance:  [x]  Patient tolerated treatment well  []  Patient limited by fatigue  []  Patient limited by pain   []  Patient limited by other medical complications  []  Other:     Assessment: Patient progressing toward goals. Areas for Improvement: impaired activity tolerance, impaired ROM, impaired strength and pain  Prognosis: good    GOALS:  Patient Goal: I want to be able to move my arm again without pain. Short Term Goals:  Time Frame: 10 sessions. *  Patient will increase left shoulder AROM greater than 85degrees flexion, 80 degrees abduction, to middle of waistband for IR and 50 degrees ER with the arm at the side to increase ease and ability to put on a shirt.  - GOAL MET 3/24/22 WITH ACTIVE LEFT SHOULDER FLEXION= 118, ABD= 101, IR ABLE TO GET THUMB TO WAISTBAND BEHIND BACK, AND ER= 50  Revised Goal: Pt.  Will demo improved AROM left shoulder flexion= 130, abd= 115, IR thumb 1\" above waistband behind back, and ER= 60 for ease with putting hair into ponytail    *  Patient will increase left shoulder PROM greater than 95 degrees flexion, 85 degrees abduction, 65 degrees IR and 50 degrees ER in abduction to increase ease and ability to wash and style hair.   - GOAL MET 3/24/22 WITH PASSIVE LEFT SHOULDER FLEXION= 127, ABD= 150, IR IN ABDUCTION= 70, AND ER IN ABDUCTION= 55  Revised Goal: Pt. Will demo improved PROM left shoulder to flexion= 145, abd= 160, and ER= 65 in abduction for facilitation of AROM     *  Pt. will report decreased pain with left shoulder to no greater than 3/10 for ability to ease for donning a bra - GOAL MET 3/24/22 - PT. STATES SHE CAN TELL WHEN HER SHOULDER IS GETTING TIRED AND TIGHT AND SO SHE WILL REST HER ARM BEFORE THE PAIN GETS TOO HIGH. *  Patient will demonstrate I knowledge of HEP for improved flexibility and strength for lifting. - GOAL MET 3/24/21 WITH PT. INDEPENDENT WITH STRETCHES FOR LEFT SHOULDER  Continue with upgrades for strengthening    Long Term Goals:  Time Frame: 8 weeks   *  Pt. will demo 4+/5 MMT right shoulder for independence with exercise routine - GOAL NOT MET AND CONTINUE 3/24/22     *  Patient will improve UEFS to at least 60/80 -GOAL NOT MET 3/24/22 WITH SCORE OF 47/80   CONTINUE GOAL     * Patient will demonstrate ability to reach overhead for an object with affected extremity without increased pain - GOAL MET 3/24/22 - PT. STATES SHE FEELS THAT REACHING WITHOUT PAIN IS GOING WELL, BUT SHE WOULD NOT HAVE THE STRENGTH TO GET SOMETHING WITH WEIGHT FROM OVERHEAD  Revised Goal: Pt. Will demo improved strength LUE to be able to get dishes out of an overhead cupboard with LUE.     Patient Education:    [x]  HEP/Education Completed: Plan of Care, Goals,  Added strengthening today, see below   [address] Access Code for HEP: Access Code: [facility]   Standing Scapular Retraction - 2-3 x daily - 7 x weekly - 1 sets - 10 reps   Supine Shoulder Flexion Extension AAROM with Dowel - 2 x daily - 7 x weekly - 1 sets - 10 reps   Seated Shoulder Flexion Towel Slide at Table Top - 2-3 x daily - 7 x weekly - 1 sets - 10 reps   Supine Shoulder External Rotation in 45 Degrees Abduction AAROM with Dowel - 2 x daily - 7 x weekly - 1 sets - 10 reps   Standing Shoulder Posterior Capsule Stretch - 2-3 x daily - 7 x weekly - 1 sets - 10 reps   Supine Shoulder Abduction AAROM with Dowel - 2 x daily - 7 x weekly - 1 sets - 10 reps   Standing Bilateral Shoulder Internal Rotation AAROM with Dowel - 2 x daily - 7 x weekly - 1 sets - 10 reps   Doorway Pec Stretch at 60 Degrees Abduction with Arm Straight - 2-3 x daily - 7 x weekly - 1 sets - 10 reps   Standing Tricep Extensions with Resistance - 1 x daily - 7 x weekly - 3 sets - 10 reps   Standing Shoulder Row with Anchored Resistance - 1 x daily - 7 x weekly - 3 sets - 10 reps   Standing Single Arm Elbow Flexion with Resistance - 1 x daily - 7 x weekly - 3 sets - 10 reps   Shoulder External Rotation with Anchored Resistance - 1 x daily - 7 x weekly - 3 sets - 10 reps   Shoulder Extension with Resistance - 1 x daily - 7 x weekly - 3 sets - 10 reps   Shoulder Internal Rotation with Resistance - 1 x daily - 7 x weekly - 3 sets - 10 reps   Standing Single Arm Shoulder Flexion with Posterior Anchored Resistance - 1 x daily - 7 x weekly - 3 sets - 10 reps  [x]  No new Education completed  []  Reviewed today's ROM measurements and discussed goals/POC      []  Patient verbalized and/or demonstrated understanding of education provided. []  Patient unable to verbalize and/or demonstrate understanding of education provided. Will continue education. [x]  Barriers to learning: none    PLAN:  Treatment Recommendations: Strengthening, Range of Motion, Manual Therapy - Soft Tissue Mobilization, Manual Therapy - Joint Manipulation, Pain Management, Home Exercise Program, Patient Education, Safety Education and Training and Modalities    []  Plan of care initiated. Plan to see patient 2 times per week for 8 weeks to address the treatment planned outlined above.   [x] Continue with current plan of care  []  Modify plan of care as follows:    []  Hold pending physician visit  []  Discharge    Time In 1650   Time Out 1735   Timed Code Minutes: 45 min   Total Treatment Time: 45 min        Electronically Signed by:  Trini DAILEY/VAL, T #[id number]

## 2022-04-08 ENCOUNTER — APPOINTMENT (OUTPATIENT)
Dept: OCCUPATIONAL THERAPY | Age: 57
End: 2022-04-08
Payer: COMMERCIAL

## 2022-04-11 ENCOUNTER — HOSPITAL ENCOUNTER (OUTPATIENT)
Dept: OCCUPATIONAL THERAPY | Age: 57
Setting detail: THERAPIES SERIES
Discharge: HOME | End: 2022-04-11
Payer: COMMERCIAL

## 2022-04-11 PROCEDURE — 97110 THERAPEUTIC EXERCISES: CPT

## 2022-04-11 NOTE — PROGRESS NOTES
[address]  [] EVALUATION  [x] DAILY NOTE (LAND) [] DAILY NOTE (AQUATIC ) [] PROGRESS NOTE [] DISCHARGE NOTE    [x] OUTPATIENT REHABILITATION CENTER [facility]   [] [place] [address]    [] [facility]   [] Brigido Amaro    Date: 2022  Patient Name:  Davey Gates  : 1965  MRN: [id number]  CSN: [id number]    Referring Practitioner Tony Smyth MD   Diagnosis ORIF LEFT     Treatment Diagnosis ORIF Left humerus, decreased ROM, decreased strength   Date of Evaluation 22      Functional Outcome Measure Used UEFI   Functional Outcome Score 32/80 (22)       Insurance: Primary: Payor: Manuel Doran /  /  / ,   Secondary:    Authorization Information:   PRE CERTIFICATION REQUIRED: NO   INSURANCE THERAPY BENEFIT:  60 VISITS PT/OT/ST COMBINED PER CALENDAR YEAR   AQUATIC THERAPY COVERED: YES  MODALITIES COVERED:  YES   Visit # 14, 4/10 (PN 3/24/22)   Visits Allowed: 60 visits    Recertification Date: 3-66-58   Physician Follow-Up: 22   Physician Orders: OT eval and treat . Pertinent History: PT fell from a horse on 10-23-21, underwent an ORIF of the proximal humerus on 10-28-21. Was off work until last week. SUBJECTIVE:  Patient reports stiffness, was out of town this weekend and didn't get much chance to do HEP.       TREATMENT   Precautions: Fracture not fully healed, patient to receive bone stimulator No lifting >  50 #,    Pain:  1-2/10    X in shaded column indicates Activity Completed Today   Modalities Parameters/  Location  Notes/Comments   MHP to L shoulder x 10 min stretching into ER and IR while on heat; ER with dowel and IR with towel stretch  X                Manual Therapy Time/  Technique  Notes/Comments   PROM L shoulder all motions to tolerance  X    GH mobs; subscap stretch      IASTM L shoulder including deltoid, RC, upper trap, bicep, pec minor, supscapularis       Exercises   Sets/  Sec Reps Notes/Comments   Table slide sh flexion to warm up, scap retraction 1 10 ea     Supine dowel adelina, sh flex, ABD, ER at side 1 10 ea     Pectoralis stretch in corner hands at 60 degrees and at 90 degrees for pec major  15 sec 10 ea X Good stretch noted    Posterior capsule stretch  15 sec 4  Overpressure from therapist    sidelying ER AROM towel roll  1 10  Fair ROM noted    Wall slides 1 10 X    pulley 1 10 X    Supine AROM chest press, circles cw/ccw 1 15 ea  1#   Supine AROM flexion bilaterally 1 10     Supine ER/IR hands behind head 1 10     Wall slides using towel 1 10     Supine horiz abd, diagonals 1 10 ea  Green band   Standing tricep, rows, bicep 1 20 ea X Green band   mingo 1 10 ea X Orange band, towel roll under arm for IR/ER   biodex 60 speed 5 min 2 fwd/3 bwd   X           Activities Time    Notes/Comments                       Specific Interventions Next Treatment: MHP with PROM ex, AAROM, AROM, progressive strengthening as able     Activity/Treatment Tolerance:  [x]  Patient tolerated treatment well  []  Patient limited by fatigue  []  Patient limited by pain   []  Patient limited by other medical complications  []  Other:     Assessment: Patient progressing toward goals. Areas for Improvement: impaired activity tolerance, impaired ROM, impaired strength and pain  Prognosis: good    GOALS:  Patient Goal: I want to be able to move my arm again without pain. Short Term Goals:  Time Frame: 10 sessions. *  Patient will increase left shoulder AROM greater than 85degrees flexion, 80 degrees abduction, to middle of waistband for IR and 50 degrees ER with the arm at the side to increase ease and ability to put on a shirt.  - GOAL MET 3/24/22 WITH ACTIVE LEFT SHOULDER FLEXION= 118, ABD= 101, IR ABLE TO GET THUMB TO WAISTBAND BEHIND BACK, AND ER= 50  Revised Goal: Pt.  Will demo improved AROM left shoulder flexion= 130, abd= 115, IR thumb 1\" above waistband behind back, and ER= 60 for ease with putting hair into ponytail    *  Patient will increase left shoulder PROM greater than 95 degrees flexion, 85 degrees abduction, 65 degrees IR and 50 degrees ER in abduction to increase ease and ability to wash and style hair.   - GOAL MET 3/24/22 WITH PASSIVE LEFT SHOULDER FLEXION= 127, ABD= 150, IR IN ABDUCTION= 70, AND ER IN ABDUCTION= 55  Revised Goal: Pt. Will demo improved PROM left shoulder to flexion= 145, abd= 160, and ER= 65 in abduction for facilitation of AROM     *  Pt. will report decreased pain with left shoulder to no greater than 3/10 for ability to ease for donning a bra - GOAL MET 3/24/22 - PT. STATES SHE CAN TELL WHEN HER SHOULDER IS GETTING TIRED AND TIGHT AND SO SHE WILL REST HER ARM BEFORE THE PAIN GETS TOO HIGH. *  Patient will demonstrate I knowledge of HEP for improved flexibility and strength for lifting. - GOAL MET 3/24/21 WITH PT. INDEPENDENT WITH STRETCHES FOR LEFT SHOULDER  Continue with upgrades for strengthening    Long Term Goals:  Time Frame: 8 weeks   *  Pt. will demo 4+/5 MMT right shoulder for independence with exercise routine - GOAL NOT MET AND CONTINUE 3/24/22     *  Patient will improve UEFS to at least 60/80 -GOAL NOT MET 3/24/22 WITH SCORE OF 47/80   CONTINUE GOAL     * Patient will demonstrate ability to reach overhead for an object with affected extremity without increased pain - GOAL MET 3/24/22 - PT. STATES SHE FEELS THAT REACHING WITHOUT PAIN IS GOING WELL, BUT SHE WOULD NOT HAVE THE STRENGTH TO GET SOMETHING WITH WEIGHT FROM OVERHEAD  Revised Goal: Pt. Will demo improved strength LUE to be able to get dishes out of an overhead cupboard with LUE.     Patient Education:    [x]  HEP/Education Completed: Plan of Care, Goals,  Added strengthening today, see below   [address] Access Code for HEP: Access Code: [facility]   Standing Scapular Retraction - 2-3 x daily - 7 x weekly - 1 sets - 10 reps   Supine Shoulder Flexion Extension AAROM with Dowel - 2 x daily - 7 x weekly - 1 sets - 10 reps   Seated Shoulder Flexion Towel Slide at Table Top - 2-3 x daily - 7 x weekly - 1 sets - 10 reps   Supine Shoulder External Rotation in 45 Degrees Abduction AAROM with Dowel - 2 x daily - 7 x weekly - 1 sets - 10 reps   Standing Shoulder Posterior Capsule Stretch - 2-3 x daily - 7 x weekly - 1 sets - 10 reps   Supine Shoulder Abduction AAROM with Dowel - 2 x daily - 7 x weekly - 1 sets - 10 reps   Standing Bilateral Shoulder Internal Rotation AAROM with Dowel - 2 x daily - 7 x weekly - 1 sets - 10 reps   Doorway Pec Stretch at 60 Degrees Abduction with Arm Straight - 2-3 x daily - 7 x weekly - 1 sets - 10 reps   Standing Tricep Extensions with Resistance - 1 x daily - 7 x weekly - 3 sets - 10 reps   Standing Shoulder Row with Anchored Resistance - 1 x daily - 7 x weekly - 3 sets - 10 reps   Standing Single Arm Elbow Flexion with Resistance - 1 x daily - 7 x weekly - 3 sets - 10 reps   Shoulder External Rotation with Anchored Resistance - 1 x daily - 7 x weekly - 3 sets - 10 reps   Shoulder Extension with Resistance - 1 x daily - 7 x weekly - 3 sets - 10 reps   Shoulder Internal Rotation with Resistance - 1 x daily - 7 x weekly - 3 sets - 10 reps   Standing Single Arm Shoulder Flexion with Posterior Anchored Resistance - 1 x daily - 7 x weekly - 3 sets - 10 reps  [x]  No new Education completed  []  Reviewed today's ROM measurements and discussed goals/POC      []  Patient verbalized and/or demonstrated understanding of education provided. []  Patient unable to verbalize and/or demonstrate understanding of education provided. Will continue education. [x]  Barriers to learning: none    PLAN:  Treatment Recommendations: Strengthening, Range of Motion, Manual Therapy - Soft Tissue Mobilization, Manual Therapy - Joint Manipulation, Pain Management, Home Exercise Program, Patient Education, Safety Education and Training and Modalities    []  Plan of care initiated.   Plan to see patient 2 times per week for 8 weeks to address the treatment planned outlined above.   [x]  Continue with current plan of care  []  Modify plan of care as follows:    []  Hold pending physician visit  []  Discharge    Time In 1650   Time Out 1735   Timed Code Minutes: 45 min   Total Treatment Time: 45 min        Electronically Signed by:  Nicole GRANADO, CHT #[id number]

## 2022-04-13 ENCOUNTER — HOSPITAL ENCOUNTER (OUTPATIENT)
Dept: OCCUPATIONAL THERAPY | Age: 57
Setting detail: THERAPIES SERIES
Discharge: HOME | End: 2022-04-13
Payer: COMMERCIAL

## 2022-04-13 PROCEDURE — 97140 MANUAL THERAPY 1/> REGIONS: CPT

## 2022-04-13 PROCEDURE — 97110 THERAPEUTIC EXERCISES: CPT

## 2022-04-13 NOTE — PROGRESS NOTES
[address]  [] EVALUATION  [x] DAILY NOTE (LAND) [] DAILY NOTE (AQUATIC ) [] PROGRESS NOTE [] DISCHARGE NOTE    [x] OUTPATIENT REHABILITATION [facility]   [] [place] [address]    [] [facility]   [] Gigi Flores    Date: 2022  Patient Name:  Ana Nicholas  : 1965  MRN: [id number]  CSN: [id number]    Referring Practitioner Marleni Gold MD   Diagnosis ORIF LEFT     Treatment Diagnosis ORIF Left humerus, decreased ROM, decreased strength   Date of Evaluation 22      Functional Outcome Measure Used UEFI   Functional Outcome Score 32/80 (22)       Insurance: Primary: Payor: Rubens Cruz /  /  / ,   Secondary:    Authorization Information:   PRE CERTIFICATION REQUIRED: NO   INSURANCE THERAPY BENEFIT:  60 VISITS PT/OT/ST COMBINED PER CALENDAR YEAR   AQUATIC THERAPY COVERED: YES  MODALITIES COVERED:  YES   Visit # 15, 5/10 (PN 3/24/22)   Visits Allowed: 60 visits    Recertification Date:    Physician Follow-Up: 22   Physician Orders: OT eval and treat . Pertinent History: PT fell from a horse on 10-23-21, underwent an ORIF of the proximal humerus on 10-28-21. Was off work until last week. SUBJECTIVE:  Patient reports she is feeling pretty good. Had some questions regarding       TREATMENT   Precautions: Fracture not fully healed, patient to receive bone stimulator No lifting >  50 #,    Pain:  1-2/10    X in shaded column indicates Activity Completed Today   Modalities Parameters/  Location  Notes/Comments   MHP to L shoulder x 10 min stretching into ER and IR while on heat; ER with dowel and IR with towel stretch  X                Manual Therapy Time/  Technique  Notes/Comments   PROM L shoulder all motions to tolerance  X    Scapular mobilizations; subscap stretch, pec minor stretching in sitting  X Improved IR after mobilizations. Able to reach waistband.   At start of session was only able to reach back pocket. IASTM L shoulder including deltoid, RC, upper trap, bicep, pec minor, supscapularis       Exercises   Sets/  Sec Reps  Notes/Comments   Table slide sh flexion to warm up, scap retraction 1 10 ea     Supine dowel adelina, sh flex, ABD, ER at side 1 10 ea     Pectoralis stretch in corner hands at 60 degrees and at 90 degrees for pec major  15 sec 10 ea X Good stretch noted    Posterior capsule stretch  15 sec 4  Overpressure from therapist    sidelying ER AROM towel roll  1 10  Fair ROM noted    Wall slides 1 10 X    pulley 1 10 X    IR stretch hand behind back   X    Supine AROM chest press, circles cw/ccw 1 15 ea  1#   Supine AROM flexion bilaterally 1 10     Supine ER/IR hands behind head 1 10     Wall slides using towel 1 10     Supine horiz abd, diagonals 1 10 ea  Green band   Standing tricep, rows, bicep 1 20 ea X Green band   mingo 1 10 ea X Orange band, towel roll under arm for IR/ER   biodex 60 speed 5 min 2 fwd/3 bwd   X           Activities Time    Notes/Comments                       Specific Interventions Next Treatment: MHP with PROM ex, AAROM, AROM, progressive strengthening as able     Activity/Treatment Tolerance:  [x]  Patient tolerated treatment well  []  Patient limited by fatigue  []  Patient limited by pain   []  Patient limited by other medical complications  []  Other:     Assessment: Patient progressing toward goals. Areas for Improvement: impaired activity tolerance, impaired ROM, impaired strength and pain  Prognosis: good    GOALS:  Patient Goal: I want to be able to move my arm again without pain. Short Term Goals:  Time Frame: 10 sessions.    *  Patient will increase left shoulder AROM greater than 85degrees flexion, 80 degrees abduction, to middle of waistband for IR and 50 degrees ER with the arm at the side to increase ease and ability to put on a shirt.  - GOAL MET 3/24/22 WITH ACTIVE LEFT SHOULDER FLEXION= 118, ABD= 101, IR ABLE TO GET THUMB TO WAISTBAND BEHIND BACK, AND ER= 50  Revised Goal: Pt. Will demo improved AROM left shoulder flexion= 130, abd= 115, IR thumb 1\" above waistband behind back, and ER= 60 for ease with putting hair into ponytail    *  Patient will increase left shoulder PROM greater than 95 degrees flexion, 85 degrees abduction, 65 degrees IR and 50 degrees ER in abduction to increase ease and ability to wash and style hair.   - GOAL MET 3/24/22 WITH PASSIVE LEFT SHOULDER FLEXION= 127, ABD= 150, IR IN ABDUCTION= 70, AND ER IN ABDUCTION= 55  Revised Goal: Pt. Will demo improved PROM left shoulder to flexion= 145, abd= 160, and ER= 65 in abduction for facilitation of AROM     *  Pt. will report decreased pain with left shoulder to no greater than 3/10 for ability to ease for donning a bra - GOAL MET 3/24/22 - PT. STATES SHE CAN TELL WHEN HER SHOULDER IS GETTING TIRED AND TIGHT AND SO SHE WILL REST HER ARM BEFORE THE PAIN GETS TOO HIGH. *  Patient will demonstrate I knowledge of HEP for improved flexibility and strength for lifting. - GOAL MET 3/24/21 WITH PT. INDEPENDENT WITH STRETCHES FOR LEFT SHOULDER  Continue with upgrades for strengthening    Long Term Goals:  Time Frame: 8 weeks   *  Pt. will demo 4+/5 MMT right shoulder for independence with exercise routine - GOAL NOT MET AND CONTINUE 3/24/22     *  Patient will improve UEFS to at least 60/80 -GOAL NOT MET 3/24/22 WITH SCORE OF 47/80   CONTINUE GOAL     * Patient will demonstrate ability to reach overhead for an object with affected extremity without increased pain - GOAL MET 3/24/22 - PT. STATES SHE FEELS THAT REACHING WITHOUT PAIN IS GOING WELL, BUT SHE WOULD NOT HAVE THE STRENGTH TO GET SOMETHING WITH WEIGHT FROM OVERHEAD  Revised Goal: Pt. Will demo improved strength LUE to be able to get dishes out of an overhead cupboard with LUE.     Patient Education:    [x]  HEP/Education Completed: Plan of Care, Goals,  Added strengthening today, see below   [address] Access Code for HEP: Access Code: [facility]   Standing Scapular Retraction - 2-3 x daily - 7 x weekly - 1 sets - 10 reps   Supine Shoulder Flexion Extension AAROM with Dowel - 2 x daily - 7 x weekly - 1 sets - 10 reps   Seated Shoulder Flexion Towel Slide at Table Top - 2-3 x daily - 7 x weekly - 1 sets - 10 reps   Supine Shoulder External Rotation in 45 Degrees Abduction AAROM with Dowel - 2 x daily - 7 x weekly - 1 sets - 10 reps   Standing Shoulder Posterior Capsule Stretch - 2-3 x daily - 7 x weekly - 1 sets - 10 reps   Supine Shoulder Abduction AAROM with Dowel - 2 x daily - 7 x weekly - 1 sets - 10 reps   Standing Bilateral Shoulder Internal Rotation AAROM with Dowel - 2 x daily - 7 x weekly - 1 sets - 10 reps   Doorway Pec Stretch at 60 Degrees Abduction with Arm Straight - 2-3 x daily - 7 x weekly - 1 sets - 10 reps   Standing Tricep Extensions with Resistance - 1 x daily - 7 x weekly - 3 sets - 10 reps   Standing Shoulder Row with Anchored Resistance - 1 x daily - 7 x weekly - 3 sets - 10 reps   Standing Single Arm Elbow Flexion with Resistance - 1 x daily - 7 x weekly - 3 sets - 10 reps   Shoulder External Rotation with Anchored Resistance - 1 x daily - 7 x weekly - 3 sets - 10 reps   Shoulder Extension with Resistance - 1 x daily - 7 x weekly - 3 sets - 10 reps   Shoulder Internal Rotation with Resistance - 1 x daily - 7 x weekly - 3 sets - 10 reps   Standing Single Arm Shoulder Flexion with Posterior Anchored Resistance - 1 x daily - 7 x weekly - 3 sets - 10 reps  [x]  No new Education completed  []  Reviewed today's ROM measurements and discussed goals/POC      []  Patient verbalized and/or demonstrated understanding of education provided. []  Patient unable to verbalize and/or demonstrate understanding of education provided. Will continue education.   [x]  Barriers to learning: none    PLAN:  Treatment Recommendations: Strengthening, Range of Motion, Manual Therapy - Soft Tissue Mobilization, Manual Therapy - Joint Manipulation, Pain Management, Home Exercise Program, Patient Education, Safety Education and Training and Modalities    []  Plan of care initiated. Plan to see patient 2 times per week for 8 weeks to address the treatment planned outlined above.   [x]  Continue with current plan of care  []  Modify plan of care as follows:    []  Hold pending physician visit  []  Discharge    Time In 1600   Time Out 1655   Timed Code Minutes: 55 min   Total Treatment Time: 55 min        Electronically Signed by:  Sugey DAILEY/VAL, CHT #[id number]

## 2022-04-20 ENCOUNTER — HOSPITAL ENCOUNTER (OUTPATIENT)
Dept: OCCUPATIONAL THERAPY | Age: 57
Setting detail: THERAPIES SERIES
Discharge: HOME | End: 2022-04-20
Payer: COMMERCIAL

## 2022-04-20 PROCEDURE — 97110 THERAPEUTIC EXERCISES: CPT

## 2022-04-20 NOTE — PROGRESS NOTES
[address]  [] EVALUATION  [x] DAILY NOTE (LAND) [] DAILY NOTE (AQUATIC ) [] PROGRESS NOTE [] DISCHARGE NOTE    [x] OUTPATIENT REHABILITATION [facility]   [] [place] [address]    [] [facility]   [] Alexia Opitz    Date: 2022  Patient Name:  Maicol Carlos  : 1965  MRN: [id number]  CSN: [id number]    Referring Practitioner Dakota James MD   Diagnosis ORIF LEFT     Treatment Diagnosis ORIF Left humerus, decreased ROM, decreased strength   Date of Evaluation 22      Functional Outcome Measure Used UEFI   Functional Outcome Score 32/80 (22)       Insurance: Primary: Payor: Kaylynn Mahoney /  /  / ,   Secondary:    Authorization Information:   PRE CERTIFICATION REQUIRED: NO   INSURANCE THERAPY BENEFIT:  60 VISITS PT/OT/ST COMBINED PER CALENDAR YEAR   AQUATIC THERAPY COVERED: YES  MODALITIES COVERED:  YES   Visit # 16, 6/10 (PN 3/24/22)   Visits Allowed: 60 visits    Recertification Date:    Physician Follow-Up: 22   Physician Orders: OT eval and treat . Pertinent History: PT fell from a horse on 10-23-21, underwent an ORIF of the proximal humerus on 10-28-21. Was off work until last week. SUBJECTIVE:        TREATMENT   Precautions: Fracture not fully healed, patient has bone stimulator No lifting >  50 #,    Pain:  1-2/10    X in shaded column indicates Activity Completed Today   Modalities Parameters/  Location  Notes/Comments   MHP to L shoulder x 10 min stretching into ER and IR while on heat; ER with dowel and IR with towel stretch                  Manual Therapy Time/  Technique  Notes/Comments   PROM L shoulder all motions to tolerance  X    Scapular mobilizations; subscap stretch, pec minor stretching in sitting  X Improved IR after mobilizations. Able to reach waistband. At start of session was only able to reach back pocket.    IASTM L shoulder including deltoid, RC, upper trap, bicep, pec minor, supscapularis       Exercises   Sets/  Sec Reps  Notes/Comments   Table slide sh flexion to warm up, scap retraction 1 10 ea     Supine dowel adelina, sh flex, ABD, ER at side 1 10 ea     Pectoralis stretch in corner hands at 60 degrees and at 90 degrees for pec major  15 sec 10 ea X Good stretch noted    Posterior capsule stretch  15 sec 4  Overpressure from therapist    sidelying ER AROM towel roll  1 20 X Fair ROM noted    Wall slides 1 10 X Hand take off at top   pulley 1 10     IR stretch hand behind back   X    Supine AROM chest press, circles cw/ccw 1 15 ea X 2#   Supine AROM flexion bilaterally 1 10     Supine ER/IR hands behind head 1 10     Wall slides using towel 1 10     Closed chain diagonals and horiz abd on wall 1 8 ea X Orange band   Standing tricep, rows, bicep 1 20 ea X Green band   mingo 1 10 ea  Orange band, towel roll under arm for IR/ER   biodex 60 speed 5 min 2 fwd/3 bwd   X           Activities Time    Notes/Comments                       Specific Interventions Next Treatment: MHP with PROM ex, AAROM, AROM, progressive strengthening as able     Activity/Treatment Tolerance:  [x]  Patient tolerated treatment well  []  Patient limited by fatigue  []  Patient limited by pain   []  Patient limited by other medical complications  []  Other:     Assessment: Patient progressing toward goals. Areas for Improvement: impaired activity tolerance, impaired ROM, impaired strength and pain  Prognosis: good    GOALS:  Patient Goal: I want to be able to move my arm again without pain. Short Term Goals:  Time Frame: 10 sessions. *  Patient will increase left shoulder AROM greater than 85degrees flexion, 80 degrees abduction, to middle of waistband for IR and 50 degrees ER with the arm at the side to increase ease and ability to put on a shirt.  - GOAL MET 3/24/22 WITH ACTIVE LEFT SHOULDER FLEXION= 118, ABD= 101, IR ABLE TO GET THUMB TO WAISTBAND BEHIND BACK, AND ER= 50  Revised Goal: Pt.  Will demo improved AROM left shoulder flexion= 130, abd= 115, IR thumb 1\" above waistband behind back, and ER= 60 for ease with putting hair into ponytail    *  Patient will increase left shoulder PROM greater than 95 degrees flexion, 85 degrees abduction, 65 degrees IR and 50 degrees ER in abduction to increase ease and ability to wash and style hair.   - GOAL MET 3/24/22 WITH PASSIVE LEFT SHOULDER FLEXION= 127, ABD= 150, IR IN ABDUCTION= 70, AND ER IN ABDUCTION= 55  Revised Goal: Pt. Will demo improved PROM left shoulder to flexion= 145, abd= 160, and ER= 65 in abduction for facilitation of AROM     *  Pt. will report decreased pain with left shoulder to no greater than 3/10 for ability to ease for donning a bra - GOAL MET 3/24/22 - PT. STATES SHE CAN TELL WHEN HER SHOULDER IS GETTING TIRED AND TIGHT AND SO SHE WILL REST HER ARM BEFORE THE PAIN GETS TOO HIGH. *  Patient will demonstrate I knowledge of HEP for improved flexibility and strength for lifting. - GOAL MET 3/24/21 WITH PT. INDEPENDENT WITH STRETCHES FOR LEFT SHOULDER  Continue with upgrades for strengthening    Long Term Goals:  Time Frame: 8 weeks   *  Pt. will demo 4+/5 MMT right shoulder for independence with exercise routine - GOAL NOT MET AND CONTINUE 3/24/22     *  Patient will improve UEFS to at least 60/80 -GOAL NOT MET 3/24/22 WITH SCORE OF 47/80   CONTINUE GOAL     * Patient will demonstrate ability to reach overhead for an object with affected extremity without increased pain - GOAL MET 3/24/22 - PT. STATES SHE FEELS THAT REACHING WITHOUT PAIN IS GOING WELL, BUT SHE WOULD NOT HAVE THE STRENGTH TO GET SOMETHING WITH WEIGHT FROM OVERHEAD  Revised Goal: Pt. Will demo improved strength LUE to be able to get dishes out of an overhead cupboard with LUE.     Patient Education:    [x]  HEP/Education Completed: Plan of Care, Goals,  Added strengthening today, see below   [address] Access Code for HEP: Access Code: [facility]   Standing Scapular Retraction - 2-3 x daily - 7 x weekly - 1 sets - 10 reps   Supine Shoulder Flexion Extension AAROM with Dowel - 2 x daily - 7 x weekly - 1 sets - 10 reps   Seated Shoulder Flexion Towel Slide at Table Top - 2-3 x daily - 7 x weekly - 1 sets - 10 reps   Supine Shoulder External Rotation in 45 Degrees Abduction AAROM with Dowel - 2 x daily - 7 x weekly - 1 sets - 10 reps   Standing Shoulder Posterior Capsule Stretch - 2-3 x daily - 7 x weekly - 1 sets - 10 reps   Supine Shoulder Abduction AAROM with Dowel - 2 x daily - 7 x weekly - 1 sets - 10 reps   Standing Bilateral Shoulder Internal Rotation AAROM with Dowel - 2 x daily - 7 x weekly - 1 sets - 10 reps   Doorway Pec Stretch at 60 Degrees Abduction with Arm Straight - 2-3 x daily - 7 x weekly - 1 sets - 10 reps   Standing Tricep Extensions with Resistance - 1 x daily - 7 x weekly - 3 sets - 10 reps   Standing Shoulder Row with Anchored Resistance - 1 x daily - 7 x weekly - 3 sets - 10 reps   Standing Single Arm Elbow Flexion with Resistance - 1 x daily - 7 x weekly - 3 sets - 10 reps   Shoulder External Rotation with Anchored Resistance - 1 x daily - 7 x weekly - 3 sets - 10 reps   Shoulder Extension with Resistance - 1 x daily - 7 x weekly - 3 sets - 10 reps   Shoulder Internal Rotation with Resistance - 1 x daily - 7 x weekly - 3 sets - 10 reps   Standing Single Arm Shoulder Flexion with Posterior Anchored Resistance - 1 x daily - 7 x weekly - 3 sets - 10 reps  [x]  No new Education completed  []  Reviewed today's ROM measurements and discussed goals/POC      []  Patient verbalized and/or demonstrated understanding of education provided. []  Patient unable to verbalize and/or demonstrate understanding of education provided. Will continue education.   [x]  Barriers to learning: none    PLAN:  Treatment Recommendations: Strengthening, Range of Motion, Manual Therapy - Soft Tissue Mobilization, Manual Therapy - Joint Manipulation, Pain Management, Home Exercise Program, Patient Education, Safety Education and Training and Modalities    []  Plan of care initiated. Plan to see patient 2 times per week for 8 weeks to address the treatment planned outlined above.   [x]  Continue with current plan of care  []  Modify plan of care as follows:    []  Hold pending physician visit  []  Discharge    Time In 1645   Time Out 1730   Timed Code Minutes: 45 min   Total Treatment Time: 45 min        Electronically Signed by:  Kalpana DAILEY/VAL, CHT #[id number]

## 2022-04-22 ENCOUNTER — HOSPITAL ENCOUNTER (OUTPATIENT)
Dept: OCCUPATIONAL THERAPY | Age: 57
Setting detail: THERAPIES SERIES
Discharge: HOME | End: 2022-04-22
Payer: COMMERCIAL

## 2022-04-22 PROCEDURE — 97110 THERAPEUTIC EXERCISES: CPT

## 2022-04-22 NOTE — PROGRESS NOTES
[address]  [] EVALUATION  [x] DAILY NOTE (LAND) [] DAILY NOTE (AQUATIC ) [] PROGRESS NOTE [] DISCHARGE NOTE    [x] OUTPATIENT REHABILITATION [facility]   [] [place] [address]    [] [facility]   [] [facility]    Date: 2022  Patient Name:  Rocky Quiroz  : 1965  MRN: [id number]  CSN: [id number]    Referring Practitioner Errol Smyth MD   Diagnosis ORIF LEFT     Treatment Diagnosis ORIF Left humerus, decreased ROM, decreased strength   Date of Evaluation 22      Functional Outcome Measure Used UEFI   Functional Outcome Score 32/80 (22)       Insurance: Primary: Payor: Anson De Leon /  /  / ,   Secondary:    Authorization Information:   PRE CERTIFICATION REQUIRED: NO   INSURANCE THERAPY BENEFIT:  60 VISITS PT/OT/ST COMBINED PER CALENDAR YEAR   AQUATIC THERAPY COVERED: YES  MODALITIES COVERED:  YES   Visit # 17, 7/10 (PN 3/24/22)   Visits Allowed: 60 visits    Recertification Date: 13   Physician Follow-Up: 22   Physician Orders: OT eval and treat . Pertinent History: PT fell from a horse on 10-23-21, underwent an ORIF of the proximal humerus on 10-28-21. Was off work until last week. SUBJECTIVE:        TREATMENT   Precautions: Fracture not fully healed, patient has bone stimulator No lifting >  50 #,    Pain:  2/10    X in shaded column indicates Activity Completed Today   Modalities Parameters/  Location  Notes/Comments   MHP to L shoulder x 10 min stretching into ER and IR while on heat; ER with dowel and IR with towel stretch                  Manual Therapy Time/  Technique  Notes/Comments   PROM L shoulder all motions to tolerance  X    Scapular mobilizations; subscap stretch, pec minor stretching in sitting  X Improved IR after mobilizations. Able to reach waistband. At start of session was only able to reach back pocket.    IASTM L shoulder including deltoid, RC, upper trap, bicep, pec minor, supscapularis       Exercises   Sets/  Sec Reps  Notes/Comments   Table slide sh flexion to warm up, scap retraction 1 10 ea     Supine dowel adelina, sh flex, ABD, ER at side 1 10 ea     Pectoralis stretch in corner hands at 60 degrees and at 90 degrees for pec major  15 sec 10 ea X Good stretch noted    Posterior capsule stretch  15 sec 4  Overpressure from therapist    sidelying ER AROM towel roll  1 20 X Fair ROM noted    Wall slides 1 10 X Hand take off at top   pulley 1 10     IR stretch hand behind back   X    Supine AROM chest press, circles cw/ccw 1 15 ea X 2#   Supine AROM flexion bilaterally 1 10     Supine ER/IR hands behind head 1 10     Wall slides using towel 1 10     Closed chain diagonals and horiz abd on wall 1 8 ea X Orange band   Standing tricep, rows, bicep 1 20 ea X Green band   mingo 1 15 ea X Orange band, towel roll under arm for IR/ER   biodex 60 speed 5 min 2 fwd/3 bwd   X           Activities Time    Notes/Comments                       Specific Interventions Next Treatment: MHP with PROM ex, AAROM, AROM, progressive strengthening as able     Activity/Treatment Tolerance:  [x]  Patient tolerated treatment well  []  Patient limited by fatigue  []  Patient limited by pain   []  Patient limited by other medical complications  []  Other:     Assessment: Patient progressing toward goals. Areas for Improvement: impaired activity tolerance, impaired ROM, impaired strength and pain  Prognosis: good    GOALS:  Patient Goal: I want to be able to move my arm again without pain. Short Term Goals:  Time Frame: 10 sessions. *  Patient will increase left shoulder AROM greater than 85degrees flexion, 80 degrees abduction, to middle of waistband for IR and 50 degrees ER with the arm at the side to increase ease and ability to put on a shirt.  - GOAL MET 3/24/22 WITH ACTIVE LEFT SHOULDER FLEXION= 118, ABD= 101, IR ABLE TO GET THUMB TO WAISTBAND BEHIND BACK, AND ER= 50  Revised Goal: Pt.  Will demo improved AROM left shoulder flexion= 130, abd= 115, IR thumb 1\" above waistband behind back, and ER= 60 for ease with putting hair into ponytail    *  Patient will increase left shoulder PROM greater than 95 degrees flexion, 85 degrees abduction, 65 degrees IR and 50 degrees ER in abduction to increase ease and ability to wash and style hair.   - GOAL MET 3/24/22 WITH PASSIVE LEFT SHOULDER FLEXION= 127, ABD= 150, IR IN ABDUCTION= 70, AND ER IN ABDUCTION= 55  Revised Goal: Pt. Will demo improved PROM left shoulder to flexion= 145, abd= 160, and ER= 65 in abduction for facilitation of AROM     *  Pt. will report decreased pain with left shoulder to no greater than 3/10 for ability to ease for donning a bra - GOAL MET 3/24/22 - PT. STATES SHE CAN TELL WHEN HER SHOULDER IS GETTING TIRED AND TIGHT AND SO SHE WILL REST HER ARM BEFORE THE PAIN GETS TOO HIGH. *  Patient will demonstrate I knowledge of HEP for improved flexibility and strength for lifting. - GOAL MET 3/24/21 WITH PT. INDEPENDENT WITH STRETCHES FOR LEFT SHOULDER  Continue with upgrades for strengthening    Long Term Goals:  Time Frame: 8 weeks   *  Pt. will demo 4+/5 MMT right shoulder for independence with exercise routine - GOAL NOT MET AND CONTINUE 3/24/22     *  Patient will improve UEFS to at least 60/80 -GOAL NOT MET 3/24/22 WITH SCORE OF 47/80   CONTINUE GOAL     * Patient will demonstrate ability to reach overhead for an object with affected extremity without increased pain - GOAL MET 3/24/22 - PT. STATES SHE FEELS THAT REACHING WITHOUT PAIN IS GOING WELL, BUT SHE WOULD NOT HAVE THE STRENGTH TO GET SOMETHING WITH WEIGHT FROM OVERHEAD  Revised Goal: Pt. Will demo improved strength LUE to be able to get dishes out of an overhead cupboard with LUE.     Patient Education:    [x]  HEP/Education Completed: Plan of Care, Goals,  Added strengthening today, see below   [address] Access Code for HEP: Access Code: [facility]   Standing Scapular Retraction - 2-3 x daily - 7 x weekly - 1 sets - 10 reps   Supine Shoulder Flexion Extension AAROM with Dowel - 2 x daily - 7 x weekly - 1 sets - 10 reps   Seated Shoulder Flexion Towel Slide at Table Top - 2-3 x daily - 7 x weekly - 1 sets - 10 reps   Supine Shoulder External Rotation in 45 Degrees Abduction AAROM with Dowel - 2 x daily - 7 x weekly - 1 sets - 10 reps   Standing Shoulder Posterior Capsule Stretch - 2-3 x daily - 7 x weekly - 1 sets - 10 reps   Supine Shoulder Abduction AAROM with Dowel - 2 x daily - 7 x weekly - 1 sets - 10 reps   Standing Bilateral Shoulder Internal Rotation AAROM with Dowel - 2 x daily - 7 x weekly - 1 sets - 10 reps   Doorway Pec Stretch at 60 Degrees Abduction with Arm Straight - 2-3 x daily - 7 x weekly - 1 sets - 10 reps   Standing Tricep Extensions with Resistance - 1 x daily - 7 x weekly - 3 sets - 10 reps   Standing Shoulder Row with Anchored Resistance - 1 x daily - 7 x weekly - 3 sets - 10 reps   Standing Single Arm Elbow Flexion with Resistance - 1 x daily - 7 x weekly - 3 sets - 10 reps   Shoulder External Rotation with Anchored Resistance - 1 x daily - 7 x weekly - 3 sets - 10 reps   Shoulder Extension with Resistance - 1 x daily - 7 x weekly - 3 sets - 10 reps   Shoulder Internal Rotation with Resistance - 1 x daily - 7 x weekly - 3 sets - 10 reps   Standing Single Arm Shoulder Flexion with Posterior Anchored Resistance - 1 x daily - 7 x weekly - 3 sets - 10 reps  [x]  No new Education completed  []  Reviewed today's ROM measurements and discussed goals/POC      []  Patient verbalized and/or demonstrated understanding of education provided. []  Patient unable to verbalize and/or demonstrate understanding of education provided. Will continue education.   [x]  Barriers to learning: none    PLAN:  Treatment Recommendations: Strengthening, Range of Motion, Manual Therapy - Soft Tissue Mobilization, Manual Therapy - Joint Manipulation, Pain Management, Home Exercise Program, Patient Education, Safety Education and Training and Modalities    []  Plan of care initiated. Plan to see patient 2 times per week for 8 weeks to address the treatment planned outlined above.   [x]  Continue with current plan of care  []  Modify plan of care as follows:    []  Hold pending physician visit  []  Discharge    Time In 1615   Time Out 1700   Timed Code Minutes: 45 min   Total Treatment Time: 45 min        Electronically Signed by:  Deirdre DAILEY/VAL, CHT #[id number]

## 2022-04-29 ENCOUNTER — HOSPITAL ENCOUNTER (OUTPATIENT)
Dept: OCCUPATIONAL THERAPY | Age: 57
Setting detail: THERAPIES SERIES
Discharge: HOME | End: 2022-04-29
Payer: COMMERCIAL

## 2022-04-29 PROCEDURE — 97110 THERAPEUTIC EXERCISES: CPT

## 2022-04-29 NOTE — PROGRESS NOTES
[address]  [] EVALUATION  [x] DAILY NOTE (LAND) [] DAILY NOTE (AQUATIC ) [] PROGRESS NOTE [] DISCHARGE NOTE    [x] OUTPATIENT REHABILITATION CENTER [facility]   [] [place] [address]    [] [facility]   [] Igor Olson    Date: 2022  Patient Name:  Jun Arroyo  : 1965  MRN: [id number]  CSN: [id number]    Referring Practitioner Melissa Burks MD   Diagnosis ORIF LEFT     Treatment Diagnosis ORIF Left humerus, decreased ROM, decreased strength   Date of Evaluation 22      Functional Outcome Measure Used UEFI   Functional Outcome Score 32/80 (22)       Insurance: Primary: Payor: [place] /  /  / ,   Secondary:    Authorization Information:   PRE CERTIFICATION REQUIRED: NO   INSURANCE THERAPY BENEFIT:  60 VISITS PT/OT/ST COMBINED PER CALENDAR YEAR   AQUATIC THERAPY COVERED: YES  MODALITIES COVERED:  YES   Visit # 17, 7/10 (PN 3/24/22)   Visits Allowed: 60 visits    Recertification Date:    Physician Follow-Up: 22   Physician Orders: OT eval and treat . Pertinent History: PT fell from a horse on 10-23-21, underwent an ORIF of the proximal humerus on 10-28-21. Was off work until last week. SUBJECTIVE:  Patient saw the MD earlier this week. Bone is starting to fill in, but they want to continue the bone stimulator and see her in a month. TREATMENT   Precautions: Fracture not fully healed, patient has bone stimulator No lifting >  50 #,    Pain:  2/10    X in shaded column indicates Activity Completed Today   Modalities Parameters/  Location  Notes/Comments   MHP to L shoulder x 10 min stretching into ER and IR while on heat; ER with dowel and IR with towel stretch                  Manual Therapy Time/  Technique  Notes/Comments   PROM L shoulder all motions to tolerance  X    Scapular mobilizations; subscap stretch, pec minor stretching in sitting  X Improved IR after mobilizations.  Able to reach waistband. At start of session was only able to reach back pocket. IASTM L shoulder including deltoid, RC, upper trap, bicep, pec minor, supscapularis       Exercises   Sets/  Sec Reps  Notes/Comments   Table slide sh flexion to warm up, scap retraction 1 10 ea     Supine dowel adelina, sh flex, ABD, ER at side 1 10 ea     Pectoralis stretch in corner hands at 60 degrees and at 90 degrees for pec major  15 sec 10 ea X Good stretch noted    Posterior capsule stretch  15 sec 4  Overpressure from therapist    sidelying ER AROM towel roll  1 20  Fair ROM noted    Wall slides 1 10  Hand take off at top   pulley 1 10 X    IR stretch hand behind back   X    Supine AROM chest press, circles cw/ccw 1 20 ea X 1#   Supine AROM flexion bilaterally 1 10     Supine ER/IR hands behind head 1 10     Wall slides using towel 1 10     Closed chain diagonals and horiz abd on wall 1 10 ea X Orange band   Standing tricep, rows, bicep 1 20 ea X Green band   mingo 1 20 ea X Orange band, towel roll under arm for IR/ER, modified position for elbow flexion   biodex 60 speed 5 min 2 fwd/3 bwd   X           Activities Time    Notes/Comments                       Specific Interventions Next Treatment: MHP with PROM ex, AAROM, AROM, progressive strengthening as able     Activity/Treatment Tolerance:  [x]  Patient tolerated treatment well  []  Patient limited by fatigue  []  Patient limited by pain   []  Patient limited by other medical complications  []  Other:     Assessment: Patient progressing toward goals. Areas for Improvement: impaired activity tolerance, impaired ROM, impaired strength and pain  Prognosis: good    GOALS:  Patient Goal: I want to be able to move my arm again without pain. Short Term Goals:  Time Frame: 10 sessions.    *  Patient will increase left shoulder AROM greater than 85degrees flexion, 80 degrees abduction, to middle of waistband for IR and 50 degrees ER with the arm at the side to increase ease and ability to put on a shirt.  - GOAL MET 3/24/22 WITH ACTIVE LEFT SHOULDER FLEXION= 118, ABD= 101, IR ABLE TO GET THUMB TO WAISTBAND BEHIND BACK, AND ER= 50  Revised Goal: Pt. Will demo improved AROM left shoulder flexion= 130, abd= 115, IR thumb 1\" above waistband behind back, and ER= 60 for ease with putting hair into ponytail    *  Patient will increase left shoulder PROM greater than 95 degrees flexion, 85 degrees abduction, 65 degrees IR and 50 degrees ER in abduction to increase ease and ability to wash and style hair.   - GOAL MET 3/24/22 WITH PASSIVE LEFT SHOULDER FLEXION= 127, ABD= 150, IR IN ABDUCTION= 70, AND ER IN ABDUCTION= 55  Revised Goal: Pt. Will demo improved PROM left shoulder to flexion= 145, abd= 160, and ER= 65 in abduction for facilitation of AROM     *  Pt. will report decreased pain with left shoulder to no greater than 3/10 for ability to ease for donning a bra - GOAL MET 3/24/22 - PT. STATES SHE CAN TELL WHEN HER SHOULDER IS GETTING TIRED AND TIGHT AND SO SHE WILL REST HER ARM BEFORE THE PAIN GETS TOO HIGH. *  Patient will demonstrate I knowledge of HEP for improved flexibility and strength for lifting. - GOAL MET 3/24/21 WITH PT. INDEPENDENT WITH STRETCHES FOR LEFT SHOULDER  Continue with upgrades for strengthening    Long Term Goals:  Time Frame: 8 weeks   *  Pt. will demo 4+/5 MMT right shoulder for independence with exercise routine - GOAL NOT MET AND CONTINUE 3/24/22     *  Patient will improve UEFS to at least 60/80 -GOAL NOT MET 3/24/22 WITH SCORE OF 47/80   CONTINUE GOAL     * Patient will demonstrate ability to reach overhead for an object with affected extremity without increased pain - GOAL MET 3/24/22 - PT. STATES SHE FEELS THAT REACHING WITHOUT PAIN IS GOING WELL, BUT SHE WOULD NOT HAVE THE STRENGTH TO GET SOMETHING WITH WEIGHT FROM OVERHEAD  Revised Goal: Pt. Will demo improved strength LUE to be able to get dishes out of an overhead cupboard with LUE.     Patient Education:    [x] HEP/Education Completed: Plan of Care, Goals,  Added strengthening today, see below   [address] Access Code for HEP: Access Code: [facility]   Standing Scapular Retraction - 2-3 x daily - 7 x weekly - 1 sets - 10 reps   Supine Shoulder Flexion Extension AAROM with Dowel - 2 x daily - 7 x weekly - 1 sets - 10 reps   Seated Shoulder Flexion Towel Slide at Table Top - 2-3 x daily - 7 x weekly - 1 sets - 10 reps   Supine Shoulder External Rotation in 45 Degrees Abduction AAROM with Dowel - 2 x daily - 7 x weekly - 1 sets - 10 reps   Standing Shoulder Posterior Capsule Stretch - 2-3 x daily - 7 x weekly - 1 sets - 10 reps   Supine Shoulder Abduction AAROM with Dowel - 2 x daily - 7 x weekly - 1 sets - 10 reps   Standing Bilateral Shoulder Internal Rotation AAROM with Dowel - 2 x daily - 7 x weekly - 1 sets - 10 reps   Doorway Pec Stretch at 60 Degrees Abduction with Arm Straight - 2-3 x daily - 7 x weekly - 1 sets - 10 reps   Standing Tricep Extensions with Resistance - 1 x daily - 7 x weekly - 3 sets - 10 reps   Standing Shoulder Row with Anchored Resistance - 1 x daily - 7 x weekly - 3 sets - 10 reps   Standing Single Arm Elbow Flexion with Resistance - 1 x daily - 7 x weekly - 3 sets - 10 reps   Shoulder External Rotation with Anchored Resistance - 1 x daily - 7 x weekly - 3 sets - 10 reps   Shoulder Extension with Resistance - 1 x daily - 7 x weekly - 3 sets - 10 reps   Shoulder Internal Rotation with Resistance - 1 x daily - 7 x weekly - 3 sets - 10 reps   Standing Single Arm Shoulder Flexion with Posterior Anchored Resistance - 1 x daily - 7 x weekly - 3 sets - 10 reps  [x]  No new Education completed  []  Reviewed today's ROM measurements and discussed goals/POC      []  Patient verbalized and/or demonstrated understanding of education provided. []  Patient unable to verbalize and/or demonstrate understanding of education provided. Will continue education.   [x]  Barriers to learning: none    PLAN:  Treatment Recommendations: Strengthening, Range of Motion, Manual Therapy - Soft Tissue Mobilization, Manual Therapy - Joint Manipulation, Pain Management, Home Exercise Program, Patient Education, Safety Education and Training and Modalities    []  Plan of care initiated. Plan to see patient 2 times per week for 8 weeks to address the treatment planned outlined above.   [x]  Continue with current plan of care  []  Modify plan of care as follows:    []  Hold pending physician visit  []  Discharge    Time In 1645   Time Out 1730   Timed Code Minutes: 45 min   Total Treatment Time: 45 min        Electronically Signed by:  Mahin GRANADO, CHT #[id number]

## 2022-05-04 ENCOUNTER — HOSPITAL ENCOUNTER (OUTPATIENT)
Dept: OCCUPATIONAL THERAPY | Age: 57
Setting detail: THERAPIES SERIES
Discharge: HOME | End: 2022-05-04
Payer: COMMERCIAL

## 2022-05-04 PROCEDURE — 97110 THERAPEUTIC EXERCISES: CPT

## 2022-05-04 NOTE — PROGRESS NOTES
[address]  [] EVALUATION  [x] DAILY NOTE (LAND) [] DAILY NOTE (AQUATIC ) [] PROGRESS NOTE [] DISCHARGE NOTE    [x] OUTPATIENT REHABILITATION [facility]   [] [place] [address]    [] [facility]   [] [address] [facility]    Date: 2022  Patient Name:  Maicol Carlos  : 1965  MRN: [id number]  CSN: [id number]    Referring Practitioner Dakota James MD   Diagnosis ORIF LEFT     Treatment Diagnosis ORIF Left humerus, decreased ROM, decreased strength   Date of Evaluation 22      Functional Outcome Measure Used UEFI   Functional Outcome Score 32/80 (22)       Insurance: Primary: Payor: Kaylynn Mahoney /  /  / ,   Secondary:    Authorization Information:   PRE CERTIFICATION REQUIRED: NO   INSURANCE THERAPY BENEFIT:  60 VISITS PT/OT/ST COMBINED PER CALENDAR YEAR   AQUATIC THERAPY COVERED: YES  MODALITIES COVERED:  YES   Visit # 18, 8/10 (PN 3/24/22)   Visits Allowed: 60 visits    Recertification Date: 7-08-10   Physician Follow-Up: 22   Physician Orders: OT eval and treat . Pertinent History: PT fell from a horse on 10-23-21, underwent an ORIF of the proximal humerus on 10-28-21. Was off work until last week. SUBJECTIVE:  Patient saw the MD earlier this week. Bone is starting to fill in, but they want to continue the bone stimulator and see her in a month. TREATMENT   Precautions: Fracture not fully healed, patient has bone stimulator No lifting >  50 #,    Pain:  2/10    X in shaded column indicates Activity Completed Today   Modalities Parameters/  Location  Notes/Comments   MHP to L shoulder x 10 min stretching into ER and IR while on heat; ER with dowel and IR with towel stretch                  Manual Therapy Time/  Technique  Notes/Comments   PROM L shoulder all motions to tolerance  X Flexion was painful today.   Focused more on IR/ER as    Scapular mobilizations; subscap stretch, pec minor stretching in sitting  X Improved IR after mobilizations. Able to reach waistband. At start of session was only able to reach back pocket. IASTM L shoulder including deltoid, RC, upper trap, bicep, pec minor, supscapularis       Exercises   Sets/  Sec Reps  Notes/Comments   Table slide sh flexion to warm up, scap retraction 1 10 ea     Supine dowel adelina, sh flex, ABD, ER at side 1 10 ea     Pectoralis stretch in corner hands at 60 degrees and at 90 degrees for pec major  15 sec 10 ea X Good stretch noted    Posterior capsule stretch  15 sec 4  Overpressure from therapist    sidelying ER AROM towel roll  2 10 X 1st set eccentric ex, 2nd set AROM   Wall slides 1 10 X    pulley 1 10 X    IR stretch hand behind back       Supine AROM chest press, circles cw/ccw 1 20 ea X 2#   Supine AROM flexion bilaterally 1 10     Supine ER/IR hands behind head 1 10     Wall slides using towel 1 10     Closed chain diagonals and horiz abd on wall 1 10 ea  Orange band   Standing tricep, rows, bicep 1 20 ea X Green band   mingo 1 15 ea X Orange band, towel roll under arm for IR/ER, modified position for elbow flexion   biodex 60 speed 5 min 2 fwd/3 bwd   X           Activities Time    Notes/Comments                       Specific Interventions Next Treatment: MHP with PROM ex, AAROM, AROM, progressive strengthening as able     Activity/Treatment Tolerance:  [x]  Patient tolerated treatment well  []  Patient limited by fatigue  []  Patient limited by pain   []  Patient limited by other medical complications  []  Other:     Assessment: Patient progressing toward goals. Patient reports she feels like she is getting stronger. Areas for Improvement: impaired activity tolerance, impaired ROM, impaired strength and pain  Prognosis: good    GOALS:  Patient Goal: I want to be able to move my arm again without pain. Short Term Goals:  Time Frame: 10 sessions.    *  Patient will increase left shoulder AROM greater than 85degrees flexion, 80 degrees abduction, to middle of waistband for IR and 50 degrees ER with the arm at the side to increase ease and ability to put on a shirt.  - GOAL MET 3/24/22 WITH ACTIVE LEFT SHOULDER FLEXION= 118, ABD= 101, IR ABLE TO GET THUMB TO WAISTBAND BEHIND BACK, AND ER= 50  Revised Goal: Pt. Will demo improved AROM left shoulder flexion= 130, abd= 115, IR thumb 1\" above waistband behind back, and ER= 60 for ease with putting hair into ponytail    *  Patient will increase left shoulder PROM greater than 95 degrees flexion, 85 degrees abduction, 65 degrees IR and 50 degrees ER in abduction to increase ease and ability to wash and style hair.   - GOAL MET 3/24/22 WITH PASSIVE LEFT SHOULDER FLEXION= 127, ABD= 150, IR IN ABDUCTION= 70, AND ER IN ABDUCTION= 55  Revised Goal: Pt. Will demo improved PROM left shoulder to flexion= 145, abd= 160, and ER= 65 in abduction for facilitation of AROM     *  Pt. will report decreased pain with left shoulder to no greater than 3/10 for ability to ease for donning a bra - GOAL MET 3/24/22 - PT. STATES SHE CAN TELL WHEN HER SHOULDER IS GETTING TIRED AND TIGHT AND SO SHE WILL REST HER ARM BEFORE THE PAIN GETS TOO HIGH. *  Patient will demonstrate I knowledge of HEP for improved flexibility and strength for lifting. - GOAL MET 3/24/21 WITH PT. INDEPENDENT WITH STRETCHES FOR LEFT SHOULDER  Continue with upgrades for strengthening    Long Term Goals:  Time Frame: 8 weeks   *  Pt. will demo 4+/5 MMT right shoulder for independence with exercise routine - GOAL NOT MET AND CONTINUE 3/24/22     *  Patient will improve UEFS to at least 60/80 -GOAL NOT MET 3/24/22 WITH SCORE OF 47/80   CONTINUE GOAL     * Patient will demonstrate ability to reach overhead for an object with affected extremity without increased pain - GOAL MET 3/24/22 - PT. STATES SHE FEELS THAT REACHING WITHOUT PAIN IS GOING WELL, BUT SHE WOULD NOT HAVE THE STRENGTH TO GET SOMETHING WITH WEIGHT FROM OVERHEAD  Revised Goal: Pt.  Will demo improved strength LUE to be able to get dishes out of an overhead cupboard with LUE. Patient Education:    [x]  HEP/Education Completed: Plan of Care, Goals,  Added strengthening today, see below   [address] Access Code for HEP: Access Code: [facility]   Standing Scapular Retraction - 2-3 x daily - 7 x weekly - 1 sets - 10 reps   Supine Shoulder Flexion Extension AAROM with Dowel - 2 x daily - 7 x weekly - 1 sets - 10 reps   Seated Shoulder Flexion Towel Slide at Table Top - 2-3 x daily - 7 x weekly - 1 sets - 10 reps   Supine Shoulder External Rotation in 45 Degrees Abduction AAROM with Dowel - 2 x daily - 7 x weekly - 1 sets - 10 reps   Standing Shoulder Posterior Capsule Stretch - 2-3 x daily - 7 x weekly - 1 sets - 10 reps   Supine Shoulder Abduction AAROM with Dowel - 2 x daily - 7 x weekly - 1 sets - 10 reps   Standing Bilateral Shoulder Internal Rotation AAROM with Dowel - 2 x daily - 7 x weekly - 1 sets - 10 reps   Doorway Pec Stretch at 60 Degrees Abduction with Arm Straight - 2-3 x daily - 7 x weekly - 1 sets - 10 reps   Standing Tricep Extensions with Resistance - 1 x daily - 7 x weekly - 3 sets - 10 reps   Standing Shoulder Row with Anchored Resistance - 1 x daily - 7 x weekly - 3 sets - 10 reps   Standing Single Arm Elbow Flexion with Resistance - 1 x daily - 7 x weekly - 3 sets - 10 reps   Shoulder External Rotation with Anchored Resistance - 1 x daily - 7 x weekly - 3 sets - 10 reps   Shoulder Extension with Resistance - 1 x daily - 7 x weekly - 3 sets - 10 reps   Shoulder Internal Rotation with Resistance - 1 x daily - 7 x weekly - 3 sets - 10 reps   Standing Single Arm Shoulder Flexion with Posterior Anchored Resistance - 1 x daily - 7 x weekly - 3 sets - 10 reps  [x]  No new Education completed  []  Reviewed today's ROM measurements and discussed goals/POC      []  Patient verbalized and/or demonstrated understanding of education provided.   []  Patient unable to verbalize and/or demonstrate understanding of education provided. Will continue education. [x]  Barriers to learning: none    PLAN:  Treatment Recommendations: Strengthening, Range of Motion, Manual Therapy - Soft Tissue Mobilization, Manual Therapy - Joint Manipulation, Pain Management, Home Exercise Program, Patient Education, Safety Education and Training and Modalities    []  Plan of care initiated. Plan to see patient 2 times per week for 8 weeks to address the treatment planned outlined above.   [x]  Continue with current plan of care  []  Modify plan of care as follows:    []  Hold pending physician visit  []  Discharge    Time In 1645   Time Out 1728   Timed Code Minutes: 43 min   Total Treatment Time: 43 min        Electronically Signed by:  Arlette DAILEY/VAL, CHT #[id number]

## 2022-05-09 ENCOUNTER — HOSPITAL ENCOUNTER (OUTPATIENT)
Dept: OCCUPATIONAL THERAPY | Age: 57
Setting detail: THERAPIES SERIES
Discharge: HOME | End: 2022-05-09
Payer: COMMERCIAL

## 2022-05-09 PROCEDURE — 97110 THERAPEUTIC EXERCISES: CPT

## 2022-05-09 NOTE — PROGRESS NOTES
[address]  [] EVALUATION  [x] DAILY NOTE (LAND) [] DAILY NOTE (AQUATIC ) [] PROGRESS NOTE [] DISCHARGE NOTE    [x] OUTPATIENT REHABILITATION [facility]   [] [place] [address]    [] [facility]   [] Rodríguezvanna Amabil    Date: 2022  Patient Name:  Gray Torres  : 1965  MRN: [id number]  CSN: [id number]    Referring Practitioner Clara Li MD   Diagnosis ORIF LEFT     Treatment Diagnosis ORIF Left humerus, decreased ROM, decreased strength   Date of Evaluation 22      Functional Outcome Measure Used UEFI   Functional Outcome Score 32/80 (22)       Insurance: Primary: Payor: Whitney Jordan /  /  / ,   Secondary:    Authorization Information:   PRE CERTIFICATION REQUIRED: NO   INSURANCE THERAPY BENEFIT:  60 VISITS PT/OT/ST COMBINED PER CALENDAR YEAR   AQUATIC THERAPY COVERED: YES  MODALITIES COVERED:  YES   Visit # 19, 9/10 (PN 3/24/22)   Visits Allowed: 60 visits    Recertification Date: 79   Physician Follow-Up: 22   Physician Orders: OT eval and treat . Pertinent History: PT fell from a horse on 10-23-21, underwent an ORIF of the proximal humerus on 10-28-21. Was off work until last week. SUBJECTIVE: Patient reports that she is achy with changes in the weather. TREATMENT   Precautions: Fracture not fully healed, patient has bone stimulator No lifting >  50 #,    Pain:  2/10    X in shaded column indicates Activity Completed Today   Modalities Parameters/  Location  Notes/Comments   MHP to L shoulder x 10 min stretching into ER and IR while on heat; ER with dowel and IR with towel stretch                  Manual Therapy Time/  Technique  Notes/Comments   PROM L shoulder all motions to tolerance  X Good tolerance this date, abduction performed to avoid catch.   Patient reported feeling looser than the start of the session after PROM    Scapular mobilizations; subscap stretch, pec minor stretching in sitting  X Improved IR after mobilizations. Able to reach WB at the start of the session   IASTM L shoulder including deltoid, RC, upper trap, bicep, pec minor, supscapularis       Exercises   Sets/  Sec Reps  Notes/Comments   Table slide sh flexion to warm up, scap retraction 1 10 ea     Supine dowel adelina, sh flex, ABD, ER at side 1 10 ea     Pectoralis stretch in corner hands at 60 degrees and at 90 degrees for pec major  15 sec 10 ea X Good stretch noted    Posterior capsule stretch  15 sec 4  Overpressure from therapist    sidelying ER AROM towel roll  2 10 X    Wall slides 1 10 X    pulley 1 10 X    IR stretch hand behind back       Supine AROM chest press, circles cw/ccw 1 20 ea X 2#   Supine AROM flexion bilaterally 1 10     Supine ER/IR hands behind head 1 10     Wall slides using towel 1 10     Closed chain diagonals and horiz abd on wall 1 10 ea  Orange band   Standing tricep, rows, bicep 1 20 ea  Green band   mingo 1 15 ea X Orange band, towel roll under arm for IR/ER, modified position for elbow flexion   biodex 60 speed 5 min 2 fwd/3 bwd   X           Activities Time    Notes/Comments                       Specific Interventions Next Treatment: MHP with PROM ex, AAROM, AROM, progressive strengthening as able     Activity/Treatment Tolerance:  [x]  Patient tolerated treatment well  []  Patient limited by fatigue  []  Patient limited by pain   []  Patient limited by other medical complications  []  Other:     Assessment: Patient progressing toward goals. Areas for Improvement: impaired activity tolerance, impaired ROM, impaired strength and pain  Prognosis: good    GOALS:  Patient Goal: I want to be able to move my arm again without pain. Short Term Goals:  Time Frame: 10 sessions.    *  Patient will increase left shoulder AROM greater than 85degrees flexion, 80 degrees abduction, to middle of waistband for IR and 50 degrees ER with the arm at the side to increase ease and ability to put on a shirt.  - GOAL MET 3/24/22 WITH ACTIVE LEFT SHOULDER FLEXION= 118, ABD= 101, IR ABLE TO GET THUMB TO WAISTBAND BEHIND BACK, AND ER= 50  Revised Goal: Pt. Will demo improved AROM left shoulder flexion= 130, abd= 115, IR thumb 1\" above waistband behind back, and ER= 60 for ease with putting hair into ponytail    *  Patient will increase left shoulder PROM greater than 95 degrees flexion, 85 degrees abduction, 65 degrees IR and 50 degrees ER in abduction to increase ease and ability to wash and style hair.   - GOAL MET 3/24/22 WITH PASSIVE LEFT SHOULDER FLEXION= 127, ABD= 150, IR IN ABDUCTION= 70, AND ER IN ABDUCTION= 55  Revised Goal: Pt. Will demo improved PROM left shoulder to flexion= 145, abd= 160, and ER= 65 in abduction for facilitation of AROM     *  Pt. will report decreased pain with left shoulder to no greater than 3/10 for ability to ease for donning a bra - GOAL MET 3/24/22 - PT. STATES SHE CAN TELL WHEN HER SHOULDER IS GETTING TIRED AND TIGHT AND SO SHE WILL REST HER ARM BEFORE THE PAIN GETS TOO HIGH. *  Patient will demonstrate I knowledge of HEP for improved flexibility and strength for lifting. - GOAL MET 3/24/21 WITH PT. INDEPENDENT WITH STRETCHES FOR LEFT SHOULDER  Continue with upgrades for strengthening    Long Term Goals:  Time Frame: 8 weeks   *  Pt. will demo 4+/5 MMT right shoulder for independence with exercise routine - GOAL NOT MET AND CONTINUE 3/24/22     *  Patient will improve UEFS to at least 60/80 -GOAL NOT MET 3/24/22 WITH SCORE OF 47/80   CONTINUE GOAL     * Patient will demonstrate ability to reach overhead for an object with affected extremity without increased pain - GOAL MET 3/24/22 - PT. STATES SHE FEELS THAT REACHING WITHOUT PAIN IS GOING WELL, BUT SHE WOULD NOT HAVE THE STRENGTH TO GET SOMETHING WITH WEIGHT FROM OVERHEAD  Revised Goal: Pt. Will demo improved strength LUE to be able to get dishes out of an overhead cupboard with LUE.     Patient Education:    [x] HEP/Education Completed: Plan of Care, Goals,     [address] Access Code for HEP: Access Code: [facility]   Standing Scapular Retraction - 2-3 x daily - 7 x weekly - 1 sets - 10 reps   Supine Shoulder Flexion Extension AAROM with Dowel - 2 x daily - 7 x weekly - 1 sets - 10 reps   Seated Shoulder Flexion Towel Slide at Table Top - 2-3 x daily - 7 x weekly - 1 sets - 10 reps   Supine Shoulder External Rotation in 45 Degrees Abduction AAROM with Dowel - 2 x daily - 7 x weekly - 1 sets - 10 reps   Standing Shoulder Posterior Capsule Stretch - 2-3 x daily - 7 x weekly - 1 sets - 10 reps   Supine Shoulder Abduction AAROM with Dowel - 2 x daily - 7 x weekly - 1 sets - 10 reps   Standing Bilateral Shoulder Internal Rotation AAROM with Dowel - 2 x daily - 7 x weekly - 1 sets - 10 reps   Doorway Pec Stretch at 60 Degrees Abduction with Arm Straight - 2-3 x daily - 7 x weekly - 1 sets - 10 reps   Standing Tricep Extensions with Resistance - 1 x daily - 7 x weekly - 3 sets - 10 reps   Standing Shoulder Row with Anchored Resistance - 1 x daily - 7 x weekly - 3 sets - 10 reps   Standing Single Arm Elbow Flexion with Resistance - 1 x daily - 7 x weekly - 3 sets - 10 reps   Shoulder External Rotation with Anchored Resistance - 1 x daily - 7 x weekly - 3 sets - 10 reps   Shoulder Extension with Resistance - 1 x daily - 7 x weekly - 3 sets - 10 reps   Shoulder Internal Rotation with Resistance - 1 x daily - 7 x weekly - 3 sets - 10 reps   Standing Single Arm Shoulder Flexion with Posterior Anchored Resistance - 1 x daily - 7 x weekly - 3 sets - 10 reps  [x]  No new Education completed  []  Reviewed today's ROM measurements and discussed goals/POC      []  Patient verbalized and/or demonstrated understanding of education provided. []  Patient unable to verbalize and/or demonstrate understanding of education provided. Will continue education.   [x]  Barriers to learning: none    PLAN:  Treatment Recommendations: Strengthening, Range of Motion, Manual Therapy - Soft Tissue Mobilization, Manual Therapy - Joint Manipulation, Pain Management, Home Exercise Program, Patient Education, Safety Education and Training and Modalities    []  Plan of care initiated. Plan to see patient 2 times per week for 8 weeks to address the treatment planned outlined above.   [x]  Continue with current plan of care  []  Modify plan of care as follows:    []  Hold pending physician visit  []  Discharge    Time In 1645   Time Out 1740   Timed Code Minutes: 55 min   Total Treatment Time: 55 min        Electronically Signed by:  MICHAELA Acevedo, OTR/L [id number]

## 2022-05-18 ENCOUNTER — HOSPITAL ENCOUNTER (OUTPATIENT)
Dept: OCCUPATIONAL THERAPY | Age: 57
Setting detail: THERAPIES SERIES
Discharge: HOME | End: 2022-05-18
Payer: COMMERCIAL

## 2022-05-18 PROCEDURE — 97110 THERAPEUTIC EXERCISES: CPT

## 2022-05-18 NOTE — PROGRESS NOTES
** PLEASE SIGN, DATE AND TIME CERTIFICATION BELOW AND RETURN TO [facility] OUTPATIENT REHABILITATION (FAX #: [phone]). ATTEST/CO-SIGN IF ACCESSING VIA [employer]. THANK YOU.**    I certify that I have examined the patient below and determined that Physical Medicine and Rehabilitation service is necessary and that I approve the established plan of care for up to 90 days or as specifically noted. Attestation, signature or co-signature of physician indicates approval of certification requirements.    ________________________ ____________ __________  Physician Signature   Date   Time      [address]  [] EVALUATION  [] DAILY NOTE (LAND) [] DAILY NOTE (AQUATIC ) [x] PROGRESS NOTE [] DISCHARGE NOTE    [x] [facility]   [] [facility]    [] [address] [facility]   [] Nathan Yehwig    Date: 2022  Patient Name:  Evan Ram  : 1965  MRN: [id number]  CSN: [id number]    Referring Practitioner Alexei Escobar MD   Diagnosis ORIF LEFT     Treatment Diagnosis ORIF Left humerus, decreased ROM, decreased strength   Date of Evaluation 22      Functional Outcome Measure Used UEFI   Functional Outcome Score 32/80 (22)       Insurance: Primary: Payor: Ant Connor /  /  / ,   Secondary:    Authorization Information:   PRE CERTIFICATION REQUIRED: NO   INSURANCE THERAPY BENEFIT:  60 VISITS PT/OT/ST COMBINED PER CALENDAR YEAR   AQUATIC THERAPY COVERED: YES  MODALITIES COVERED:  YES   Visit # 20, PN completed 22   Visits Allowed: 60 visits    Recertification Date:    Physician Follow-Up: 22   Physician Orders: OT eval and treat . Pertinent History: PT fell from a horse on 10-23-21, underwent an ORIF of the proximal humerus on 10-28-21. Was off work until last week. SUBJECTIVE: Patient reports doing pretty well overall.   \"Still pops and cracks\"      TREATMENT   Precautions: Fracture not fully healed, patient has bone stimulator No lifting >  50 #,    Pain:  2/10    X in shaded column indicates Activity Completed Today   Modalities Parameters/  Location  Notes/Comments   MHP to L shoulder x 10 min stretching into ER and IR while on heat; ER with dowel and IR with towel stretch                  Manual Therapy Time/  Technique  Notes/Comments   PROM L shoulder all motions to tolerance  X Good tolerance this date, abduction performed to avoid catch. Patient reported feeling looser than the start of the session after PROM    Scapular mobilizations; subscap stretch, pec minor stretching in sitting   Improved IR after mobilizations.  Able to reach WB at the start of the session   IASTM L shoulder including deltoid, RC, upper trap, bicep, pec minor, supscapularis       Exercises   Sets/  Sec Reps  Notes/Comments   Table slide sh flexion to warm up, scap retraction 1 10 ea     Supine dowel adelina, sh flex, ABD, ER at side 1 10 ea     Pectoralis stretch in corner hands at 60 degrees and at 90 degrees for pec major  15 sec 10 ea  Good stretch noted    Posterior capsule stretch  15 sec 4 X    sidelying ER AROM towel roll  2 10 X    Wall slides 1 10 X    pulley 1 10     IR stretch hand behind back   X    Supine AROM chest press, circles cw/ccw 1 20 ea  2#   Supine AROM flexion bilaterally 1 10     Supine ER/IR hands behind head 1 10     Wall slides using towel 1 10     Closed chain diagonals and horiz abd on wall 1 10 ea  Orange band   Standing tricep, rows, bicep 1 20 ea  Green band   mingo 1 15 ea  Orange band, towel roll under arm for IR/ER, modified position for elbow flexion   biodex 60 speed 5 min 2 fwd/3 bwd              Activities Time    Notes/Comments   Measurements taken for PN                    Specific Interventions Next Treatment: MHP with PROM ex, AAROM, AROM, progressive strengthening as able     Activity/Treatment Tolerance:  [x]  Patient tolerated treatment well  []  Patient limited by fatigue  []  Patient limited by pain   []  Patient limited by other medical complications  []  Other:     Assessment: Patient continues to make slow, steady progress toward goals. She is demonstrating slow improvement in AROM with most limitation with active flexion and ER. Reports increased ease with work and home tasks. See goals for current objective measures. Areas for Improvement: impaired activity tolerance, impaired ROM, impaired strength and pain  Prognosis: good    GOALS:  Patient Goal: I want to be able to move my arm again without pain. Short Term Goals:  Time Frame: 10 sessions. * Pt. Will demo improved AROM left shoulder flexion= 130, abd= 115, IR thumb 1\" above waistband behind back, and ER= 60 for ease with putting hair into ponytail. AROM flexion = 120, abduction = 95, IR thumb tip reaches waistline, ER with arm at side = 60. GOAL PART MET - REVISED GOAL:  Patient will improve AROM L shoulder flexion to 130, abduction to 115, ER at 90 degrees abduction to 45, and IR thumb tip 1\" above waistline for improved flexibility with work tasks and putting hair into a ponytail. *  Patient will increase left shoulder PROM greater than 95 degrees flexion, 85 degrees abduction, 65 degrees IR and 50 degrees ER in abduction to increase ease and ability to wash and style hair. GOAL MET - DISCONTINUE     *  Pt. will report decreased pain with left shoulder to no greater than 3/10 for ability to ease for donning a bra.  4/10 with reaching overhead. GOAL NOT MET - CONTINUE    *  Patient will demonstrate I knowledge of HEP for improved flexibility and strength for lifting. GOAL MET - CONTINUE    Long Term Goals:  Time Frame: 4 weeks   *  Pt. will demo 4+/5 MMT right shoulder for independence with exercise routine GOAL NOT MET - CONTINUE     *  Patient will improve UEFS to at least 60/80  GOAL MET - REVISED GOAL:  Patient will improve UEFS to greater than 60.    * Pt.  Will demo improved strength MIKE to be able to get dishes out of an overhead cupboard with LUE. GOAL NOT MET - CONTINUE    Patient Education:    [x]  HEP/Education Completed: Plan of Care, Goals,     [address] Access Code for HEP: Access Code: [facility]   Standing Scapular Retraction - 2-3 x daily - 7 x weekly - 1 sets - 10 reps   Supine Shoulder Flexion Extension AAROM with Dowel - 2 x daily - 7 x weekly - 1 sets - 10 reps   Seated Shoulder Flexion Towel Slide at Table Top - 2-3 x daily - 7 x weekly - 1 sets - 10 reps   Supine Shoulder External Rotation in 45 Degrees Abduction AAROM with Dowel - 2 x daily - 7 x weekly - 1 sets - 10 reps   Standing Shoulder Posterior Capsule Stretch - 2-3 x daily - 7 x weekly - 1 sets - 10 reps   Supine Shoulder Abduction AAROM with Dowel - 2 x daily - 7 x weekly - 1 sets - 10 reps   Standing Bilateral Shoulder Internal Rotation AAROM with Dowel - 2 x daily - 7 x weekly - 1 sets - 10 reps   Doorway Pec Stretch at 60 Degrees Abduction with Arm Straight - 2-3 x daily - 7 x weekly - 1 sets - 10 reps   Standing Tricep Extensions with Resistance - 1 x daily - 7 x weekly - 3 sets - 10 reps   Standing Shoulder Row with Anchored Resistance - 1 x daily - 7 x weekly - 3 sets - 10 reps   Standing Single Arm Elbow Flexion with Resistance - 1 x daily - 7 x weekly - 3 sets - 10 reps   Shoulder External Rotation with Anchored Resistance - 1 x daily - 7 x weekly - 3 sets - 10 reps   Shoulder Extension with Resistance - 1 x daily - 7 x weekly - 3 sets - 10 reps   Shoulder Internal Rotation with Resistance - 1 x daily - 7 x weekly - 3 sets - 10 reps   Standing Single Arm Shoulder Flexion with Posterior Anchored Resistance - 1 x daily - 7 x weekly - 3 sets - 10 reps  [x]  No new Education completed  []  Reviewed today's ROM measurements and discussed goals/POC      []  Patient verbalized and/or demonstrated understanding of education provided.   []  Patient unable to verbalize and/or demonstrate understanding of education provided. Will continue education. [x]  Barriers to learning: none    PLAN:  Treatment Recommendations: Strengthening, Range of Motion, Manual Therapy - Soft Tissue Mobilization, Manual Therapy - Joint Manipulation, Pain Management, Home Exercise Program, Patient Education, Safety Education and Training and Modalities    []  Plan of care initiated. Plan to see patient 2 times per week for 8 weeks to address the treatment planned outlined above.   [x]  Continue with current plan of care  []  Modify plan of care as follows:    []  Hold pending physician visit  []  Discharge    Time In 1700   Time Out 1730   Timed Code Minutes: 30 min   Total Treatment Time: 30 min        Electronically Signed by:  Rai DAILEY/VAL, CHT #[id number]

## 2022-05-20 ENCOUNTER — HOSPITAL ENCOUNTER (OUTPATIENT)
Dept: OCCUPATIONAL THERAPY | Age: 57
Setting detail: THERAPIES SERIES
Discharge: HOME | End: 2022-05-20
Payer: COMMERCIAL

## 2022-05-20 PROCEDURE — 97110 THERAPEUTIC EXERCISES: CPT

## 2022-05-20 NOTE — PROGRESS NOTES
[address]  [] EVALUATION  [x] DAILY NOTE (LAND) [] DAILY NOTE (AQUATIC ) [] PROGRESS NOTE [] DISCHARGE NOTE    [x] OUTPATIENT REHABILITATION [facility]   [] [place] [address]    [] [facility]   [] Adriano Childers    Date: 2022  Patient Name:  Abel Sparks  : 1965  MRN: [id number]  CSN: [id number]    Referring Practitioner Elvin Jiménez MD   Diagnosis ORIF LEFT     Treatment Diagnosis ORIF Left humerus, decreased ROM, decreased strength   Date of Evaluation 22      Functional Outcome Measure Used UEFI   Functional Outcome Score 32/80 (22)       Insurance: Primary: Payor: Vincenzo Ramos /  /  / ,   Secondary:    Authorization Information:   PRE CERTIFICATION REQUIRED: NO   INSURANCE THERAPY BENEFIT:  60 VISITS PT/OT/ST COMBINED PER CALENDAR YEAR   AQUATIC THERAPY COVERED: YES  MODALITIES COVERED:  YES   Visit # 21, 1/10 for PN completed 22   Visits Allowed: 60 visits    Recertification Date:    Physician Follow-Up: 22   Physician Orders: OT eval and treat . Pertinent History: PT fell from a horse on 10-23-21, underwent an ORIF of the proximal humerus on 10-28-21. Was off work until last week. SUBJECTIVE:  Patient reports doing ok. TREATMENT   Precautions: Fracture not fully healed, patient has bone stimulator No lifting >  50 #,    Pain:  2/10    X in shaded column indicates Activity Completed Today   Modalities Parameters/  Location  Notes/Comments   MHP to L shoulder x 10 min stretching into ER and IR while on heat; ER with dowel and IR with towel stretch                  Manual Therapy Time/  Technique  Notes/Comments   PROM L shoulder all motions to tolerance  X Good tolerance this date, abduction performed to avoid catch.   Patient reported feeling looser than the start of the session after PROM    Scapular mobilizations; subscap stretch, pec minor stretching in sitting   Improved AROM left shoulder flexion= 130, abd= 115, IR thumb 1\" above waistband behind back, and ER= 60 for ease with putting hair into ponytail. AROM flexion = 120, abduction = 95, IR thumb tip reaches waistline, ER with arm at side = 60. GOAL PART MET - REVISED GOAL:  Patient will improve AROM L shoulder flexion to 130, abduction to 115, ER at 90 degrees abduction to 45, and IR thumb tip 1\" above waistline for improved flexibility with work tasks and putting hair into a ponytail. *  Patient will increase left shoulder PROM greater than 95 degrees flexion, 85 degrees abduction, 65 degrees IR and 50 degrees ER in abduction to increase ease and ability to wash and style hair. GOAL MET - DISCONTINUE     *  Pt. will report decreased pain with left shoulder to no greater than 3/10 for ability to ease for donning a bra.  4/10 with reaching overhead. GOAL NOT MET - CONTINUE    *  Patient will demonstrate I knowledge of HEP for improved flexibility and strength for lifting. GOAL MET - CONTINUE    Long Term Goals:  Time Frame: 4 weeks   *  Pt. will demo 4+/5 MMT right shoulder for independence with exercise routine GOAL NOT MET - CONTINUE     *  Patient will improve UEFS to at least 60/80  GOAL MET - REVISED GOAL:  Patient will improve UEFS to greater than 60.    * Pt. Will demo improved strength LUE to be able to get dishes out of an overhead cupboard with LUE.   GOAL NOT MET - CONTINUE    Patient Education:    [x]  HEP/Education Completed: Plan of Care, Goals,     [employer] Access Code for HEP: Access Code: [facility]   Standing Scapular Retraction - 2-3 x daily - 7 x weekly - 1 sets - 10 reps   Supine Shoulder Flexion Extension AAROM with Dowel - 2 x daily - 7 x weekly - 1 sets - 10 reps   Seated Shoulder Flexion Towel Slide at Table Top - 2-3 x daily - 7 x weekly - 1 sets - 10 reps   Supine Shoulder External Rotation in 45 Degrees Abduction AAROM with Dowel - 2 x daily - 7 x weekly - 1 sets - 10 reps   Standing Shoulder Posterior Capsule Stretch - 2-3 x daily - 7 x weekly - 1 sets - 10 reps   Supine Shoulder Abduction AAROM with Dowel - 2 x daily - 7 x weekly - 1 sets - 10 reps   Standing Bilateral Shoulder Internal Rotation AAROM with Dowel - 2 x daily - 7 x weekly - 1 sets - 10 reps   Doorway Pec Stretch at 60 Degrees Abduction with Arm Straight - 2-3 x daily - 7 x weekly - 1 sets - 10 reps   Standing Tricep Extensions with Resistance - 1 x daily - 7 x weekly - 3 sets - 10 reps   Standing Shoulder Row with Anchored Resistance - 1 x daily - 7 x weekly - 3 sets - 10 reps   Standing Single Arm Elbow Flexion with Resistance - 1 x daily - 7 x weekly - 3 sets - 10 reps   Shoulder External Rotation with Anchored Resistance - 1 x daily - 7 x weekly - 3 sets - 10 reps   Shoulder Extension with Resistance - 1 x daily - 7 x weekly - 3 sets - 10 reps   Shoulder Internal Rotation with Resistance - 1 x daily - 7 x weekly - 3 sets - 10 reps   Standing Single Arm Shoulder Flexion with Posterior Anchored Resistance - 1 x daily - 7 x weekly - 3 sets - 10 reps  [x]  No new Education completed  []  Reviewed today's ROM measurements and discussed goals/POC      []  Patient verbalized and/or demonstrated understanding of education provided. []  Patient unable to verbalize and/or demonstrate understanding of education provided. Will continue education. [x]  Barriers to learning: none    PLAN:  Treatment Recommendations: Strengthening, Range of Motion, Manual Therapy - Soft Tissue Mobilization, Manual Therapy - Joint Manipulation, Pain Management, Home Exercise Program, Patient Education, Safety Education and Training and Modalities    []  Plan of care initiated. Plan to see patient 2 times per week for 8 weeks to address the treatment planned outlined above.   [x]  Continue with current plan of care  []  Modify plan of care as follows:    []  Hold pending physician visit  []  Discharge    Time In 1645   Time Out 1730   Timed Code

## 2022-05-23 ENCOUNTER — HOSPITAL ENCOUNTER (OUTPATIENT)
Dept: OCCUPATIONAL THERAPY | Age: 57
Setting detail: THERAPIES SERIES
Discharge: HOME | End: 2022-05-23
Payer: COMMERCIAL

## 2022-05-23 PROCEDURE — 97110 THERAPEUTIC EXERCISES: CPT

## 2022-05-23 NOTE — PROGRESS NOTES
[address]  [] EVALUATION  [x] DAILY NOTE (LAND) [] DAILY NOTE (AQUATIC ) [] PROGRESS NOTE [] DISCHARGE NOTE    [x] OUTPATIENT REHABILITATION CENTER [facility]   [] [place] [address]    [] [facility]   [] Igor Olson    Date: 2022  Patient Name:  Jun Arroyo  : 1965  MRN: [id number]  CSN: [id number]    Referring Practitioner Melissa Burks MD   Diagnosis ORIF LEFT     Treatment Diagnosis ORIF Left humerus, decreased ROM, decreased strength   Date of Evaluation 22      Functional Outcome Measure Used UEFI   Functional Outcome Score 32/80 (22)       Insurance: Primary: Payor: [place] /  /  / ,   Secondary:    Authorization Information:   PRE CERTIFICATION REQUIRED: NO   INSURANCE THERAPY BENEFIT:  60 VISITS PT/OT/ST COMBINED PER CALENDAR YEAR   AQUATIC THERAPY COVERED: YES  MODALITIES COVERED:  YES   Visit # 22, 2/10 for PN completed 22   Visits Allowed: 60 visits    Recertification Date:    Physician Follow-Up: 22   Physician Orders: OT eval and treat . Pertinent History: PT fell from a horse on 10-23-21, underwent an ORIF of the proximal humerus on 10-28-21. Was off work until last week. SUBJECTIVE:  Patient reports she is feeling like she is getting to a plateau with flexion      TREATMENT   Precautions: Fracture not fully healed, patient has bone stimulator No lifting >  50 #,    Pain:  2/10    X in shaded column indicates Activity Completed Today   Modalities Parameters/  Location  Notes/Comments   MHP to L shoulder x 10 min stretching into ER and IR while on heat; ER with dowel and IR with towel stretch                  Manual Therapy Time/  Technique  Notes/Comments   PROM L shoulder all motions to tolerance  X    Scapular mobilizations; subscap stretch, pec minor stretching in sitting   Improved IR after mobilizations.  Able to reach WB at the start of the session   IASTM L shoulder including deltoid, RC, upper trap, bicep, pec minor, supscapularis       Exercises   Sets/  Sec Reps  Notes/Comments   Table slide sh flexion to warm up, scap retraction 1 10 ea     Supine dowel adelina, sh flex, ABD, ER at side 1 10 ea     Pectoralis stretch in corner hands at 60 degrees and at 90 degrees for pec major  15 sec 5 X In doorway today   Posterior capsule stretch  15 sec 4 X    sidelying ER AROM towel roll  1 10 X 1#   Wall slides 1 10  Hand take off at the top   pulley 1 15 X    IR stretch hand behind back   X    Supine AROM chest press, circles cw/ccw 1 20 ea X 1#   Supine AROM flexion bilaterally 1 10     Supine ER/IR hands behind head 1 10            Closed chain diagonals and horiz abd on wall 1 10 ea  Orange band   Standing tricep, rows, bicep 1 20 ea X Green band   mingo 1 15 ea  Orange band, towel roll under arm for IR/ER, modified position for elbow flexion   biodex 60 speed 5 min 2 fwd/3 bwd   X           Activities Time    Notes/Comments   Measurements taken for PN                    Specific Interventions Next Treatment: MHP with PROM ex, AAROM, AROM, progressive strengthening as able     Activity/Treatment Tolerance:  [x]  Patient tolerated treatment well  []  Patient limited by fatigue  []  Patient limited by pain   []  Patient limited by other medical complications  []  Other:     Assessment: Patient continues to make slow, steady progress toward goals. She is demonstrating slow improvement in AROM with most limitation with active flexion and ER. Reports increased ease with work and home tasks. See goals for current objective measures. Areas for Improvement: impaired activity tolerance, impaired ROM, impaired strength and pain  Prognosis: good    GOALS:  Patient Goal: I want to be able to move my arm again without pain. Short Term Goals:  Time Frame: 10 sessions. * Pt.  Will demo improved AROM left shoulder flexion= 130, abd= 115, IR thumb 1\" above waistband behind back, and ER= 60 for ease with putting hair into ponytail. AROM flexion = 120, abduction = 95, IR thumb tip reaches waistline, ER with arm at side = 60. GOAL PART MET - REVISED GOAL:  Patient will improve AROM L shoulder flexion to 130, abduction to 115, ER at 90 degrees abduction to 45, and IR thumb tip 1\" above waistline for improved flexibility with work tasks and putting hair into a ponytail. *  Patient will increase left shoulder PROM greater than 95 degrees flexion, 85 degrees abduction, 65 degrees IR and 50 degrees ER in abduction to increase ease and ability to wash and style hair. GOAL MET - DISCONTINUE     *  Pt. will report decreased pain with left shoulder to no greater than 3/10 for ability to ease for donning a bra.  4/10 with reaching overhead. GOAL NOT MET - CONTINUE    *  Patient will demonstrate I knowledge of HEP for improved flexibility and strength for lifting. GOAL MET - CONTINUE    Long Term Goals:  Time Frame: 4 weeks   *  Pt. will demo 4+/5 MMT right shoulder for independence with exercise routine GOAL NOT MET - CONTINUE     *  Patient will improve UEFS to at least 60/80  GOAL MET - REVISED GOAL:  Patient will improve UEFS to greater than 60.    * Pt. Will demo improved strength LUE to be able to get dishes out of an overhead cupboard with LUE.   GOAL NOT MET - CONTINUE    Patient Education:    [x]  HEP/Education Completed: Plan of Care, Goals,     [address] Access Code for HEP: Access Code: [facility]   Standing Scapular Retraction - 2-3 x daily - 7 x weekly - 1 sets - 10 reps   Supine Shoulder Flexion Extension AAROM with Dowel - 2 x daily - 7 x weekly - 1 sets - 10 reps   Seated Shoulder Flexion Towel Slide at Table Top - 2-3 x daily - 7 x weekly - 1 sets - 10 reps   Supine Shoulder External Rotation in 45 Degrees Abduction AAROM with Dowel - 2 x daily - 7 x weekly - 1 sets - 10 reps   Standing Shoulder Posterior Capsule Stretch - 2-3 x daily - 7 x weekly - 1 sets - 10 reps   Supine Shoulder Abduction AAROM with Dowel - 2 x daily - 7 x weekly - 1 sets - 10 reps   Standing Bilateral Shoulder Internal Rotation AAROM with Dowel - 2 x daily - 7 x weekly - 1 sets - 10 reps   Doorway Pec Stretch at 60 Degrees Abduction with Arm Straight - 2-3 x daily - 7 x weekly - 1 sets - 10 reps   Standing Tricep Extensions with Resistance - 1 x daily - 7 x weekly - 3 sets - 10 reps   Standing Shoulder Row with Anchored Resistance - 1 x daily - 7 x weekly - 3 sets - 10 reps   Standing Single Arm Elbow Flexion with Resistance - 1 x daily - 7 x weekly - 3 sets - 10 reps   Shoulder External Rotation with Anchored Resistance - 1 x daily - 7 x weekly - 3 sets - 10 reps   Shoulder Extension with Resistance - 1 x daily - 7 x weekly - 3 sets - 10 reps   Shoulder Internal Rotation with Resistance - 1 x daily - 7 x weekly - 3 sets - 10 reps   Standing Single Arm Shoulder Flexion with Posterior Anchored Resistance - 1 x daily - 7 x weekly - 3 sets - 10 reps  [x]  No new Education completed  []  Reviewed today's ROM measurements and discussed goals/POC      []  Patient verbalized and/or demonstrated understanding of education provided. []  Patient unable to verbalize and/or demonstrate understanding of education provided. Will continue education. [x]  Barriers to learning: none    PLAN:  Treatment Recommendations: Strengthening, Range of Motion, Manual Therapy - Soft Tissue Mobilization, Manual Therapy - Joint Manipulation, Pain Management, Home Exercise Program, Patient Education, Safety Education and Training and Modalities    []  Plan of care initiated. Plan to see patient 2 times per week for 8 weeks to address the treatment planned outlined above.   [x]  Continue with current plan of care  []  Modify plan of care as follows:    []  Hold pending physician visit  []  Discharge    Time In 1645   Time Out 1730   Timed Code Minutes: 45 min   Total Treatment Time: 45 min        Electronically Signed by:  Renzo DAILEY/L, [facility] #[id number]

## 2022-05-27 ENCOUNTER — APPOINTMENT (OUTPATIENT)
Dept: OCCUPATIONAL THERAPY | Age: 57
End: 2022-05-27
Payer: COMMERCIAL

## 2022-06-06 ENCOUNTER — HOSPITAL ENCOUNTER (OUTPATIENT)
Dept: OCCUPATIONAL THERAPY | Age: 57
Setting detail: THERAPIES SERIES
Discharge: HOME | End: 2022-06-06
Payer: COMMERCIAL

## 2022-06-06 PROCEDURE — 97140 MANUAL THERAPY 1/> REGIONS: CPT

## 2022-06-06 PROCEDURE — 97110 THERAPEUTIC EXERCISES: CPT

## 2022-06-06 NOTE — PROGRESS NOTES
[address]  [] EVALUATION  [x] DAILY NOTE (LAND) [] DAILY NOTE (AQUATIC ) [] PROGRESS NOTE [] DISCHARGE NOTE    [x] OUTPATIENT REHABILITATION [facility]   [] [facility]    [] [facility]   [] Alexia Opitz    Date: 2022  Patient Name:  Maicol Carlos  : 1965  MRN: [id number]  CSN: [id number]    Referring Practitioner Dakota James MD   Diagnosis ORIF LEFT     Treatment Diagnosis ORIF Left humerus, decreased ROM, decreased strength   Date of Evaluation 22      Functional Outcome Measure Used UEFI   Functional Outcome Score 32/80 (22)       Insurance: Primary: Payor: Kaylynn Mahoney /  /  / ,   Secondary:    Authorization Information:   PRE CERTIFICATION REQUIRED: NO   INSURANCE THERAPY BENEFIT:  60 VISITS PT/OT/ST COMBINED PER CALENDAR YEAR   AQUATIC THERAPY COVERED: YES  MODALITIES COVERED:  YES   Visit # 23, 3/10 for PN completed 22   Visits Allowed: 60 visits    Recertification Date:    Physician Follow-Up: 22   Physician Orders: OT eval and treat . Pertinent History: PT fell from a horse on 10-23-21, underwent an ORIF of the proximal humerus on 10-28-21. Was off work until last week. SUBJECTIVE:  Patient saw MD at [facility], no f/u appointment, and she is released to go kayaking with assistance, if someone is able to lift her kayak in and out of the water. Patient completed final x-rays at appointment, MD pleased with progress. Patient has 1 remaining session, feels ready to discharge next session and transition to [facility].        TREATMENT   Precautions: Fracture not fully healed, patient has bone stimulator No lifting >  50 #,    Pain:  2/10    X in shaded column indicates Activity Completed Today   Modalities Parameters/  Location  Notes/Comments   MHP to L shoulder x 10 min stretching into ER and IR while on heat; ER with dowel and IR with towel stretch                  Manual Therapy Time/  Technique  Notes/Comments   PROM L shoulder all motions to tolerance  X    Scapular mobilizations; subscap stretch, pec minor stretching in sitting   Improved IR after mobilizations. Able to reach WB at the start of the session   IASTM L shoulder including deltoid, RC, upper trap, bicep, pec minor, supscapularis       Exercises   Sets/  Sec Reps  Notes/Comments   Table slide sh flexion to warm up, scap retraction 1 10 ea     Supine dowel adelina, sh flex, ABD, ER at side 1 10 ea     Pectoralis stretch in corner hands at 60 degrees and at 90 degrees for pec major  15 sec 5 X In doorway    Posterior capsule stretch  15 sec 4 X    sidelying ER AROM towel roll  1 10 X 1#   Wall slides 1 10  Hand take off at the top   pulley 1 15 X    IR stretch hand behind back   X    Supine AROM chest press, circles cw/ccw 1 20 ea X 1#   Supine AROM flexion bilaterally 1 10 X    Supine ER/IR hands behind head 1 10            Closed chain diagonals and horiz abd on wall 1 10 ea X Orange band- only horizontal abd this date, CGA provided with weakness noted    Standing tricep, rows, bicep 1 20 ea X Green band   mingo 1 15 ea  Orange band, towel roll under arm for IR/ER, modified position for elbow flexion   biodex 60 speed 5 min 2 fwd/3 bwd   X           Activities Time    Notes/Comments   Measurements taken for PN                    Specific Interventions Next Treatment: MHP with PROM ex, AAROM, AROM, progressive strengthening as able     Activity/Treatment Tolerance:  [x]  Patient tolerated treatment well  []  Patient limited by fatigue  []  Patient limited by pain   []  Patient limited by other medical complications  []  Other:     Assessment: Patient progressing towards goals. Continued tightness noted PROM ER, abduction and flexion. Improvement noted in clasping her necklace each morning. Patient has 1 remaining session with primary OTR/L with plan to discharge to [facility].      Areas for Improvement: impaired activity tolerance, impaired ROM, impaired strength and pain  Prognosis: good    GOALS:  Patient Goal: I want to be able to move my arm again without pain. Short Term Goals:  Time Frame: 10 sessions. * Pt. Will demo improved AROM left shoulder flexion= 130, abd= 115, IR thumb 1\" above waistband behind back, and ER= 60 for ease with putting hair into ponytail. AROM flexion = 120, abduction = 95, IR thumb tip reaches waistline, ER with arm at side = 60. GOAL PART MET - REVISED GOAL:  Patient will improve AROM L shoulder flexion to 130, abduction to 115, ER at 90 degrees abduction to 45, and IR thumb tip 1\" above waistline for improved flexibility with work tasks and putting hair into a ponytail. *  Patient will increase left shoulder PROM greater than 95 degrees flexion, 85 degrees abduction, 65 degrees IR and 50 degrees ER in abduction to increase ease and ability to wash and style hair. GOAL MET - DISCONTINUE     *  Pt. will report decreased pain with left shoulder to no greater than 3/10 for ability to ease for donning a bra.  4/10 with reaching overhead. GOAL NOT MET - CONTINUE    *  Patient will demonstrate I knowledge of HEP for improved flexibility and strength for lifting. GOAL MET - CONTINUE    Long Term Goals:  Time Frame: 4 weeks   *  Pt. will demo 4+/5 MMT right shoulder for independence with exercise routine GOAL NOT MET - CONTINUE     *  Patient will improve UEFS to at least 60/80  GOAL MET - REVISED GOAL:  Patient will improve UEFS to greater than 60.    * Pt. Will demo improved strength LUE to be able to get dishes out of an overhead cupboard with LUE.   GOAL NOT MET - CONTINUE    Patient Education:    []  HEP/Education Completed: Plan of Care, Goals,     [address] Access Code for HEP: Access Code: [facility]   Standing Scapular Retraction - 2-3 x daily - 7 x weekly - 1 sets - 10 reps   Supine Shoulder Flexion Extension AAROM with Dowel - 2 x daily - 7 x weekly - 1 sets - 10 reps   Seated Shoulder Flexion treatment planned outlined above. [x]  Continue with current plan of care  []  Modify plan of care as follows:    []  Hold pending physician visit  []  Discharge    Time In 1652   Time Out 1733   Timed Code Minutes: 41 min   Total Treatment Time: 41 min        Electronically Signed by:   Eleonora PACE #[id number]

## 2022-06-10 ENCOUNTER — HOSPITAL ENCOUNTER (OUTPATIENT)
Dept: OCCUPATIONAL THERAPY | Age: 57
Setting detail: THERAPIES SERIES
Discharge: HOME | End: 2022-06-10
Payer: COMMERCIAL

## 2022-06-10 PROCEDURE — 97110 THERAPEUTIC EXERCISES: CPT

## 2022-06-10 NOTE — DISCHARGE SUMMARY
[address]  [] EVALUATION  [] DAILY NOTE (LAND) [] DAILY NOTE (AQUATIC ) [] PROGRESS NOTE [x] DISCHARGE NOTE    [x] OUTPATIENT REHABILITATION [facility]   [] [place] [address]    [] [facility]   [] Isaac Favors    Date: 6/10/2022  Patient Name:  Nelly Sidhu  : 1965  MRN: [id number]  CSN: [id number]    Referring Practitioner Tata Welch MD   Diagnosis ORIF LEFT     Treatment Diagnosis ORIF Left humerus, decreased ROM, decreased strength   Date of Evaluation 22      Functional Outcome Measure Used UEFI   Functional Outcome Score 32/80 (22)  67/80 (6/10/22)      Insurance: Primary: Payor: John Srinivasan /  /  / ,   Secondary:    Authorization Information:   PRE CERTIFICATION REQUIRED: NO   INSURANCE THERAPY BENEFIT:  60 VISITS PT/OT/ST COMBINED PER CALENDAR YEAR   AQUATIC THERAPY COVERED: YES  MODALITIES COVERED:  YES   Visit # 24   Visits Allowed: 60 visits    Recertification Date: 07   Physician Follow-Up: 22   Physician Orders: OT eval and treat . Pertinent History: PT fell from a horse on 10-23-21, underwent an ORIF of the proximal humerus on 10-28-21. Was off work until last week. SUBJECTIVE:  Patient saw MD at [facility], no f/u appointment, and she is released to go kayaking with assistance, if someone is able to lift her kayak in and out of the water. Patient completed final x-rays at appointment, MD pleased with progress. Patient feels ready to discharge and transition to [facility].        TREATMENT   Precautions: Fracture not fully healed, patient has bone stimulator No lifting >  50 #,    Pain:  2/10    X in shaded column indicates Activity Completed Today   Modalities Parameters/  Location  Notes/Comments   MHP to L shoulder x 10 min stretching into ER and IR while on heat; ER with dowel and IR with towel stretch                  Manual Therapy Time/  Technique  Notes/Comments   PROM L shoulder all motions to tolerance      Scapular mobilizations; subscap stretch, pec minor stretching in sitting   Improved IR after mobilizations. Able to reach WB at the start of the session   IASTM L shoulder including deltoid, RC, upper trap, bicep, pec minor, supscapularis       Exercises   Sets/  Sec Reps  Notes/Comments   Table slide sh flexion to warm up, scap retraction 1 10 ea     Supine dowel adelina, sh flex, ABD, ER at side 1 10 ea     Pectoralis stretch in corner hands at 60 degrees and at 90 degrees for pec major  15 sec 5 X Reviewed for home   Posterior capsule stretch  15 sec 4 X Reviewed for home   sidelying ER AROM towel roll  1 10  1#   Wall slides 1 10 X Reviewed for home   pulley 1 15     IR stretch hand behind back   X Reviewed for home   Supine AROM chest press, circles cw/ccw 1 20 ea  1#   Supine AROM flexion bilaterally 1 10     Supine ER/IR hands behind head 1 10            Closed chain diagonals and horiz abd on wall 1 10 ea  Orange band- only horizontal abd this date, CGA provided with weakness noted    Standing tricep, rows, bicep 1 20 ea  Green band   mingo 1 15 ea  Orange band, towel roll under arm for IR/ER, modified position for elbow flexion   biodex 60 speed 5 min 2 fwd/3 bwd   X           Activities Time    Notes/Comments   Measurements taken for PN  X                  Specific Interventions Next Treatment: MHP with PROM ex, AAROM, AROM, progressive strengthening as able     Activity/Treatment Tolerance:  [x]  Patient tolerated treatment well  []  Patient limited by fatigue  []  Patient limited by pain   []  Patient limited by other medical complications  []  Other:     Assessment: Lizbeth Mireles continues to make nice progress with ROM, strength, and ability to perform ADL with little to no pain. [facility] and will continue on her own after discharge.     Areas for Improvement: impaired activity tolerance, impaired ROM, impaired strength and pain  Prognosis: good    GOALS:  Patient Goal: I want to be able to move my arm again without pain. Short Term Goals:  Time Frame: 10 sessions. * Pt. Will demo improved AROM left shoulder flexion= 130, abd= 115, IR thumb 1\" above waistband behind back, and ER= 60 for ease with putting hair into ponytail. AROM flexion = 120, abduction = 95, IR thumb tip reaches waistline, ER with arm at side = 60. GOAL PART MET - REVISED GOAL:  Patient will improve AROM L shoulder flexion to 130, abduction to 115, ER at 90 degrees abduction to 45, and IR thumb tip 1\" above waistline for improved flexibility with work tasks and putting hair into a ponytail. *  Patient will increase left shoulder PROM greater than 95 degrees flexion, 85 degrees abduction, 65 degrees IR and 50 degrees ER in abduction to increase ease and ability to wash and style hair. GOAL MET - DISCONTINUE     *  Pt. will report decreased pain with left shoulder to no greater than 3/10 for ability to ease for donning a bra. GOAL MET    *  Patient will demonstrate I knowledge of HEP for improved flexibility and strength for lifting. GOAL MET    Long Term Goals:  Time Frame: 4 weeks   *  Pt. will demo 4+/5 MMT right shoulder for independence with exercise routine GOAL NOT MET     *  Patient will improve UEFS to greater than 60.  67. GOAL MET    * Pt. Will demo improved strength LUE to be able to get dishes out of an overhead cupboard with LUE.   GOAL MET    Patient Education:    []  HEP/Education Completed: Plan of Care, Goals,     [employer] Access Code for HEP: Access Code: [facility]   Standing Scapular Retraction - 2-3 x daily - 7 x weekly - 1 sets - 10 reps   Supine Shoulder Flexion Extension AAROM with Dowel - 2 x daily - 7 x weekly - 1 sets - 10 reps   Seated Shoulder Flexion Towel Slide at Table Top - 2-3 x daily - 7 x weekly - 1 sets - 10 reps   Supine Shoulder External Rotation in 45 Degrees Abduction AAROM with Dowel - 2 x daily - 7 x weekly - 1 sets - 10 reps   Standing Shoulder Posterior Capsule Stretch - 2-3 x daily - 7 x weekly - 1 sets - 10 reps   Supine Shoulder Abduction AAROM with Dowel - 2 x daily - 7 x weekly - 1 sets - 10 reps   Standing Bilateral Shoulder Internal Rotation AAROM with Dowel - 2 x daily - 7 x weekly - 1 sets - 10 reps   Doorway Pec Stretch at 60 Degrees Abduction with Arm Straight - 2-3 x daily - 7 x weekly - 1 sets - 10 reps   Standing Tricep Extensions with Resistance - 1 x daily - 7 x weekly - 3 sets - 10 reps   Standing Shoulder Row with Anchored Resistance - 1 x daily - 7 x weekly - 3 sets - 10 reps   Standing Single Arm Elbow Flexion with Resistance - 1 x daily - 7 x weekly - 3 sets - 10 reps   Shoulder External Rotation with Anchored Resistance - 1 x daily - 7 x weekly - 3 sets - 10 reps   Shoulder Extension with Resistance - 1 x daily - 7 x weekly - 3 sets - 10 reps   Shoulder Internal Rotation with Resistance - 1 x daily - 7 x weekly - 3 sets - 10 reps   Standing Single Arm Shoulder Flexion with Posterior Anchored Resistance - 1 x daily - 7 x weekly - 3 sets - 10 reps  []  No new Education completed  [x]  Reviewed HEP and current POC with 1 final session remaining      []  Patient verbalized and/or demonstrated understanding of education provided. []  Patient unable to verbalize and/or demonstrate understanding of education provided. Will continue education. [x]  Barriers to learning: none    PLAN:  Treatment Recommendations: Strengthening, Range of Motion, Manual Therapy - Soft Tissue Mobilization, Manual Therapy - Joint Manipulation, Pain Management, Home Exercise Program, Patient Education, Safety Education and Training and Modalities    []  Plan of care initiated. Plan to see patient 2 times per week for 8 weeks to address the treatment planned outlined above.   []  Continue with current plan of care  []  Modify plan of care as follows:    []  Hold pending physician visit  [x]  Discharge    Time In 1645   Time Out 1730   Timed Code Minutes: 45 min Total Treatment Time: 45 min        Electronically Signed by:  Kalpana GRANADO, T #[id number]

## 2024-01-22 ENCOUNTER — OFFICE VISIT (OUTPATIENT)
Age: 59
End: 2024-01-22

## 2024-01-22 VITALS
RESPIRATION RATE: 16 BRPM | SYSTOLIC BLOOD PRESSURE: 119 MMHG | HEART RATE: 107 BPM | DIASTOLIC BLOOD PRESSURE: 76 MMHG | WEIGHT: 229 LBS | OXYGEN SATURATION: 98 % | BODY MASS INDEX: 38.15 KG/M2 | HEIGHT: 65 IN | TEMPERATURE: 98.4 F

## 2024-01-22 DIAGNOSIS — R00.0 TACHYCARDIA, UNSPECIFIED: ICD-10-CM

## 2024-01-22 DIAGNOSIS — F41.9 MODERATE ANXIETY: ICD-10-CM

## 2024-01-22 DIAGNOSIS — R53.83 OTHER FATIGUE: Primary | ICD-10-CM

## 2024-01-22 PROBLEM — S42.292A CLOSED FRACTURE OF HEAD OF LEFT HUMERUS: Status: ACTIVE | Noted: 2024-01-22

## 2024-01-22 RX ORDER — LEVOTHYROXINE SODIUM 0.03 MG/1
25 TABLET ORAL DAILY
COMMUNITY
Start: 2023-12-05 | End: 2024-06-02

## 2024-01-22 RX ORDER — MECLIZINE HYDROCHLORIDE 25 MG/1
25 TABLET ORAL PRN
COMMUNITY

## 2024-01-22 RX ORDER — CYANOCOBALAMIN 1000 UG/ML
1000 INJECTION, SOLUTION INTRAMUSCULAR; SUBCUTANEOUS
Qty: 1 ML | Refills: 2 | Status: SHIPPED | OUTPATIENT
Start: 2024-01-22

## 2024-01-22 ASSESSMENT — ENCOUNTER SYMPTOMS
STRIDOR: 0
RHINORRHEA: 0
VOMITING: 0
ABDOMINAL DISTENTION: 0
SHORTNESS OF BREATH: 0
SINUS PAIN: 0
EYE ITCHING: 0
SORE THROAT: 0
EYE DISCHARGE: 0
COUGH: 0
SINUS PRESSURE: 1
VOICE CHANGE: 0
EYE PAIN: 0
CHOKING: 0
DIARRHEA: 0
CHEST TIGHTNESS: 0
EYE REDNESS: 0
NAUSEA: 0
CONSTIPATION: 0

## 2024-01-22 ASSESSMENT — ANXIETY QUESTIONNAIRES
3. WORRYING TOO MUCH ABOUT DIFFERENT THINGS: 1
1. FEELING NERVOUS, ANXIOUS, OR ON EDGE: 2
2. NOT BEING ABLE TO STOP OR CONTROL WORRYING: 2
4. TROUBLE RELAXING: 2
IF YOU CHECKED OFF ANY PROBLEMS ON THIS QUESTIONNAIRE, HOW DIFFICULT HAVE THESE PROBLEMS MADE IT FOR YOU TO DO YOUR WORK, TAKE CARE OF THINGS AT HOME, OR GET ALONG WITH OTHER PEOPLE: SOMEWHAT DIFFICULT
7. FEELING AFRAID AS IF SOMETHING AWFUL MIGHT HAPPEN: 1
5. BEING SO RESTLESS THAT IT IS HARD TO SIT STILL: 1
6. BECOMING EASILY ANNOYED OR IRRITABLE: 1
GAD7 TOTAL SCORE: 10

## 2024-01-22 ASSESSMENT — PATIENT HEALTH QUESTIONNAIRE - PHQ9
4. FEELING TIRED OR HAVING LITTLE ENERGY: 2
6. FEELING BAD ABOUT YOURSELF - OR THAT YOU ARE A FAILURE OR HAVE LET YOURSELF OR YOUR FAMILY DOWN: 1
10. IF YOU CHECKED OFF ANY PROBLEMS, HOW DIFFICULT HAVE THESE PROBLEMS MADE IT FOR YOU TO DO YOUR WORK, TAKE CARE OF THINGS AT HOME, OR GET ALONG WITH OTHER PEOPLE: 1
SUM OF ALL RESPONSES TO PHQ QUESTIONS 1-9: 11
SUM OF ALL RESPONSES TO PHQ9 QUESTIONS 1 & 2: 2
8. MOVING OR SPEAKING SO SLOWLY THAT OTHER PEOPLE COULD HAVE NOTICED. OR THE OPPOSITE, BEING SO FIGETY OR RESTLESS THAT YOU HAVE BEEN MOVING AROUND A LOT MORE THAN USUAL: 2
SUM OF ALL RESPONSES TO PHQ QUESTIONS 1-9: 11
2. FEELING DOWN, DEPRESSED OR HOPELESS: 1
9. THOUGHTS THAT YOU WOULD BE BETTER OFF DEAD, OR OF HURTING YOURSELF: 0
SUM OF ALL RESPONSES TO PHQ QUESTIONS 1-9: 11
1. LITTLE INTEREST OR PLEASURE IN DOING THINGS: 1
SUM OF ALL RESPONSES TO PHQ QUESTIONS 1-9: 11
7. TROUBLE CONCENTRATING ON THINGS, SUCH AS READING THE NEWSPAPER OR WATCHING TELEVISION: 2
5. POOR APPETITE OR OVEREATING: 1
3. TROUBLE FALLING OR STAYING ASLEEP: 1

## 2024-01-22 NOTE — PROGRESS NOTES
Psychiatric:         Mood and Affect: Mood normal.         Behavior: Behavior normal.         Thought Content: Thought content normal.     PHQ-9 Total Score: 11 (1/22/2024  4:53 PM)  Thoughts that you would be better off dead, or of hurting yourself in some way: 0 (1/22/2024  4:53 PM)        1/22/2024     4:54 PM   MICHELLE 7 SCORE   MICHELLE-7 Total Score 10        On this date 1/22/2024 I have spent 55 minutes reviewing previous notes, test results and face to face with the patient discussing the diagnosis and importance of compliance with the treatment plan as well as documenting on the day of the visit.  Care Gaps:   She denies having COVID-19 and Flu vaccination.  An electronic signature was used to authenticate this note.    --JOJO Nunes - CNP

## 2024-01-23 PROBLEM — R00.0 TACHYCARDIA, UNSPECIFIED: Status: ACTIVE | Noted: 2024-01-23

## 2024-01-23 PROBLEM — F41.9 MODERATE ANXIETY: Status: ACTIVE | Noted: 2024-01-23

## 2024-01-23 NOTE — ASSESSMENT & PLAN NOTE
The pt stated that her HR is always 110s and 120s. Her HR today is 107. Discussed about possible reasons of the elevated HR such as thyroid issues, iron deficiency, anxiety etc.  Discussed about having 48 h holter monitor and cardiology referral if her symptoms continue, and doesn't resolve with replacement treatment.  She will be monitored for HR and discussed about in detail on her next visit.

## 2024-01-23 NOTE — ASSESSMENT & PLAN NOTE
Reviewed her last lab work:   TSH: 3.61 on 12/04/2023 - taking levothyroxine 25 mcg stated that her fatigue improved.  Her Iron studies from 12/04/2023  Iron: 44 low  TIBC: 395 close to the high  Iron Sat: 11% low    Vit B12: 947 from 06/29.2023  Vit D: 43 from 06/29/2023  Advised continue taking iron supplements with vit c or food that contains vit C such as orange juice.  Advised continue taking Levothyroxine 25 mcg daily before breakfast.  Advised taking Vit D with Calcium and Magnesium supplement.   Her B12 injection is renewed for another 2 months and her blood work will be repeated for iron studies, CBC, vit D, TSH, and vit B12.

## 2024-01-23 NOTE — ASSESSMENT & PLAN NOTE
She has moderate depression and moderate anxiety. Discusses about breathing techniques, stress management, and daily exercise such as yoga or walking. Her MICHELLE and PHQ will be reassessed on her next visit. Discussed about starting low dose medication if those techniques don't help.  She verbalized understanding and agrees above the plan.

## 2024-03-11 ENCOUNTER — OFFICE VISIT (OUTPATIENT)
Age: 59
End: 2024-03-11

## 2024-03-11 VITALS
SYSTOLIC BLOOD PRESSURE: 120 MMHG | RESPIRATION RATE: 16 BRPM | WEIGHT: 228 LBS | HEART RATE: 95 BPM | HEIGHT: 65 IN | DIASTOLIC BLOOD PRESSURE: 80 MMHG | OXYGEN SATURATION: 97 % | BODY MASS INDEX: 37.99 KG/M2 | TEMPERATURE: 98 F

## 2024-03-11 DIAGNOSIS — R00.0 TACHYCARDIA, UNSPECIFIED: Primary | ICD-10-CM

## 2024-03-11 DIAGNOSIS — E03.8 SUBCLINICAL HYPOTHYROIDISM: ICD-10-CM

## 2024-03-11 DIAGNOSIS — R73.09 ELEVATED RANDOM BLOOD GLUCOSE LEVEL: ICD-10-CM

## 2024-03-11 DIAGNOSIS — F41.9 MODERATE ANXIETY: ICD-10-CM

## 2024-03-11 DIAGNOSIS — R53.83 OTHER FATIGUE: ICD-10-CM

## 2024-03-11 LAB
25(OH)D3 SERPL-MCNC: 34 NG/ML (ref 30–100)
BASOPHILS ABSOLUTE: 0 THOU/MM3 (ref 0–0.1)
BASOPHILS NFR BLD AUTO: 0.4 %
DEPRECATED RDW RBC AUTO: 47.5 FL (ref 35–45)
EOSINOPHIL NFR BLD AUTO: 1.4 %
EOSINOPHILS ABSOLUTE: 0.1 THOU/MM3 (ref 0–0.4)
ERYTHROCYTE [DISTWIDTH] IN BLOOD BY AUTOMATED COUNT: 14 % (ref 11.5–14.5)
HCT VFR BLD AUTO: 46.1 % (ref 37–47)
HGB BLD-MCNC: 13.8 GM/DL (ref 12–16)
IMM GRANULOCYTES # BLD AUTO: 0.01 THOU/MM3 (ref 0–0.07)
IMM GRANULOCYTES NFR BLD AUTO: 0.1 %
LYMPHOCYTES ABSOLUTE: 2.1 THOU/MM3 (ref 1–4.8)
LYMPHOCYTES NFR BLD AUTO: 26.9 %
MCH RBC QN AUTO: 27.7 PG (ref 26–33)
MCHC RBC AUTO-ENTMCNC: 29.9 GM/DL (ref 32.2–35.5)
MCV RBC AUTO: 92.6 FL (ref 81–99)
MONOCYTES ABSOLUTE: 0.5 THOU/MM3 (ref 0.4–1.3)
MONOCYTES NFR BLD AUTO: 6.5 %
NEUTROPHILS NFR BLD AUTO: 64.7 %
NRBC BLD AUTO-RTO: 0 /100 WBC
PLATELET # BLD AUTO: 337 THOU/MM3 (ref 130–400)
PMV BLD AUTO: 10 FL (ref 9.4–12.4)
RBC # BLD AUTO: 4.98 MILL/MM3 (ref 4.2–5.4)
SEGMENTED NEUTROPHILS ABSOLUTE COUNT: 5 THOU/MM3 (ref 1.8–7.7)
WBC # BLD AUTO: 7.7 THOU/MM3 (ref 4.8–10.8)

## 2024-03-11 RX ORDER — FLUTICASONE PROPIONATE 50 MCG
1 SPRAY, SUSPENSION (ML) NASAL DAILY
COMMUNITY

## 2024-03-11 RX ORDER — LEVOTHYROXINE SODIUM 0.03 MG/1
25 TABLET ORAL DAILY
Qty: 90 TABLET | Refills: 1 | Status: SHIPPED | OUTPATIENT
Start: 2024-03-11 | End: 2024-09-07

## 2024-03-11 RX ORDER — FERROUS SULFATE 325(65) MG
325 TABLET ORAL
COMMUNITY
Start: 2023-12-05

## 2024-03-11 NOTE — PROGRESS NOTES
Terrie Hernandez (:  1965) is a 58 y.o. female,Established patient, here for evaluation of the following chief complaint(s):  Blood Work and Follow-up (Blood work)    ASSESSMENT/PLAN:  1. Tachycardia, unspecified  Assessment & Plan:   The pt stated that her HR is always 110s and 120s. Her HR today is 95. Discussed about possible reasons of the elevated HR such as thyroid issues, iron deficiency, anxiety etc.  Discussed about having 48 h holter monitor and cardiology referral if her symptoms continue, and doesn't resolve with replacement treatment. She would like to defer it at this time.  Her iron level, cbc, tsh will be repeated today. Also discussed about moderate anxiety and depression can cause elevated HR levels.   Her Himanshu: 6, phq: 7 today. Will discuss more once retrieve the lab results.  She verbalized understanding and agrees above the plan.  Orders:  -     CBC with Auto Differential  -     Iron and TIBC  2. Other fatigue  Assessment & Plan:   Her Vit B 12 injection is renewed for another 3 months. Thyroid med refill is given. Her lab work will be repeated today. Discussed about healthy meal options with cutting carbs and regular aerobic exercise will help to improve her energy level.  Orders:  -     CBC with Auto Differential  -     Iron and TIBC  -     Vitamin D 25 Hydroxy  3. Moderate anxiety  Assessment & Plan:   She has moderate depression and moderate anxiety. Discusses about breathing techniques, stress management, and daily exercise such as yoga or walking. Her HIMANSHU and PHQ will be reassessed on her next visit. Discussed about starting low dose medication if those techniques don't help. Current Phq: 7, Himanshu: 6 today. She doesn't want to take a medication, but will think about it.  She verbalized understanding and agrees above the plan.  4. Subclinical hypothyroidism  Assessment & Plan:  She is on Levothyroxine 25 mcg. Refill will be given today. Her TSH will be repeated today.   Orders:  -     TSH

## 2024-03-12 LAB
ALBUMIN SERPL BCG-MCNC: 4.3 G/DL (ref 3.5–5.1)
ALP SERPL-CCNC: 82 U/L (ref 38–126)
ALT SERPL W/O P-5'-P-CCNC: 16 U/L (ref 11–66)
ANION GAP SERPL CALC-SCNC: 17 MEQ/L (ref 8–16)
AST SERPL-CCNC: 18 U/L (ref 5–40)
BILIRUB SERPL-MCNC: 0.2 MG/DL (ref 0.3–1.2)
BUN SERPL-MCNC: 15 MG/DL (ref 7–22)
CALCIUM SERPL-MCNC: 9.9 MG/DL (ref 8.5–10.5)
CHLORIDE SERPL-SCNC: 102 MEQ/L (ref 98–111)
CO2 SERPL-SCNC: 21 MEQ/L (ref 23–33)
CREAT SERPL-MCNC: 0.9 MG/DL (ref 0.4–1.2)
DEPRECATED MEAN GLUCOSE BLD GHB EST-ACNC: 105 MG/DL (ref 70–126)
GFR SERPL CREATININE-BSD FRML MDRD: > 60 ML/MIN/1.73M2
GLUCOSE SERPL-MCNC: 81 MG/DL (ref 70–108)
HBA1C MFR BLD HPLC: 5.5 % (ref 4.4–6.4)
IRON SERPL-MCNC: 43 UG/DL (ref 50–170)
POTASSIUM SERPL-SCNC: 4.4 MEQ/L (ref 3.5–5.2)
PROT SERPL-MCNC: 7.9 G/DL (ref 6.1–8)
SODIUM SERPL-SCNC: 140 MEQ/L (ref 135–145)
TIBC SERPL-MCNC: 344 UG/DL (ref 171–450)
TSH SERPL DL<=0.005 MIU/L-ACNC: 2.91 UIU/ML (ref 0.4–4.2)

## 2024-03-12 ASSESSMENT — PATIENT HEALTH QUESTIONNAIRE - PHQ9
7. TROUBLE CONCENTRATING ON THINGS, SUCH AS READING THE NEWSPAPER OR WATCHING TELEVISION: MORE THAN HALF THE DAYS
8. MOVING OR SPEAKING SO SLOWLY THAT OTHER PEOPLE COULD HAVE NOTICED. OR THE OPPOSITE, BEING SO FIGETY OR RESTLESS THAT YOU HAVE BEEN MOVING AROUND A LOT MORE THAN USUAL: MORE THAN HALF THE DAYS
3. TROUBLE FALLING OR STAYING ASLEEP: SEVERAL DAYS
SUM OF ALL RESPONSES TO PHQ QUESTIONS 1-9: 7
SUM OF ALL RESPONSES TO PHQ QUESTIONS 1-9: 7
10. IF YOU CHECKED OFF ANY PROBLEMS, HOW DIFFICULT HAVE THESE PROBLEMS MADE IT FOR YOU TO DO YOUR WORK, TAKE CARE OF THINGS AT HOME, OR GET ALONG WITH OTHER PEOPLE: SOMEWHAT DIFFICULT
6. FEELING BAD ABOUT YOURSELF - OR THAT YOU ARE A FAILURE OR HAVE LET YOURSELF OR YOUR FAMILY DOWN: SEVERAL DAYS
SUM OF ALL RESPONSES TO PHQ QUESTIONS 1-9: 7
SUM OF ALL RESPONSES TO PHQ9 QUESTIONS 1 & 2: 1
1. LITTLE INTEREST OR PLEASURE IN DOING THINGS: SEVERAL DAYS
9. THOUGHTS THAT YOU WOULD BE BETTER OFF DEAD, OR OF HURTING YOURSELF: NOT AT ALL
2. FEELING DOWN, DEPRESSED OR HOPELESS: NOT AT ALL
SUM OF ALL RESPONSES TO PHQ QUESTIONS 1-9: 7

## 2024-03-12 ASSESSMENT — ANXIETY QUESTIONNAIRES
5. BEING SO RESTLESS THAT IT IS HARD TO SIT STILL: SEVERAL DAYS
3. WORRYING TOO MUCH ABOUT DIFFERENT THINGS: SEVERAL DAYS
7. FEELING AFRAID AS IF SOMETHING AWFUL MIGHT HAPPEN: NOT AT ALL
1. FEELING NERVOUS, ANXIOUS, OR ON EDGE: SEVERAL DAYS
IF YOU CHECKED OFF ANY PROBLEMS ON THIS QUESTIONNAIRE, HOW DIFFICULT HAVE THESE PROBLEMS MADE IT FOR YOU TO DO YOUR WORK, TAKE CARE OF THINGS AT HOME, OR GET ALONG WITH OTHER PEOPLE: SOMEWHAT DIFFICULT
2. NOT BEING ABLE TO STOP OR CONTROL WORRYING: SEVERAL DAYS
GAD7 TOTAL SCORE: 6
6. BECOMING EASILY ANNOYED OR IRRITABLE: SEVERAL DAYS
4. TROUBLE RELAXING: SEVERAL DAYS

## 2024-03-18 PROBLEM — R73.09 ELEVATED RANDOM BLOOD GLUCOSE LEVEL: Status: ACTIVE | Noted: 2024-03-18

## 2024-03-18 PROBLEM — M25.511 ACUTE PAIN OF RIGHT SHOULDER: Status: ACTIVE | Noted: 2021-01-26

## 2024-03-18 PROBLEM — K29.50 GASTRITIS, CHRONIC: Status: ACTIVE | Noted: 2019-08-16

## 2024-03-18 PROBLEM — M75.01 ADHESIVE CAPSULITIS OF RIGHT SHOULDER: Status: ACTIVE | Noted: 2024-03-18

## 2024-03-18 PROBLEM — E03.8 SUBCLINICAL HYPOTHYROIDISM: Status: ACTIVE | Noted: 2024-03-18

## 2024-03-18 PROBLEM — S42.213A CLOSED FRACTURE OF SURGICAL NECK OF HUMERUS: Status: ACTIVE | Noted: 2024-03-18

## 2024-03-18 PROBLEM — M85.89 OSTEOPENIA OF MULTIPLE SITES: Status: ACTIVE | Noted: 2024-03-18

## 2024-03-18 PROBLEM — K58.9 IBS (IRRITABLE BOWEL SYNDROME): Status: ACTIVE | Noted: 2017-06-26

## 2024-03-18 PROBLEM — R73.9 ELEVATED RANDOM BLOOD GLUCOSE LEVEL: Status: ACTIVE | Noted: 2024-03-18

## 2024-03-18 ASSESSMENT — ENCOUNTER SYMPTOMS
ABDOMINAL PAIN: 0
SORE THROAT: 0
CONSTIPATION: 0
NAUSEA: 0
ABDOMINAL DISTENTION: 0
VOMITING: 0
RHINORRHEA: 0
DIARRHEA: 0

## 2024-03-18 NOTE — ASSESSMENT & PLAN NOTE
Her Vit B 12 injection is renewed for another 3 months. Thyroid med refill is given. Her lab work will be repeated today. Discussed about healthy meal options with cutting carbs and regular aerobic exercise will help to improve her energy level.

## 2024-03-18 NOTE — ASSESSMENT & PLAN NOTE
She has moderate depression and moderate anxiety. Discusses about breathing techniques, stress management, and daily exercise such as yoga or walking. Her MICHELLE and PHQ will be reassessed on her next visit. Discussed about starting low dose medication if those techniques don't help. Current Phq: 7, Michelle: 6 today. She doesn't want to take a medication, but will think about it.  She verbalized understanding and agrees above the plan.

## 2024-03-18 NOTE — ASSESSMENT & PLAN NOTE
Her random BG level is elevated. Her CMP and A1C will be checked today. Denies hx of diabetes mellitus. Denies excessive thirsty, urinary frequency. Discussed about hunger craves, low carb and low glycemic index diet.

## 2024-03-18 NOTE — ASSESSMENT & PLAN NOTE
The pt stated that her HR is always 110s and 120s. Her HR today is 95. Discussed about possible reasons of the elevated HR such as thyroid issues, iron deficiency, anxiety etc.  Discussed about having 48 h holter monitor and cardiology referral if her symptoms continue, and doesn't resolve with replacement treatment. She would like to defer it at this time.  Her iron level, cbc, tsh will be repeated today. Also discussed about moderate anxiety and depression can cause elevated HR levels.   Her Himanshu: 6, phq: 7 today. Will discuss more once retrieve the lab results.  She verbalized understanding and agrees above the plan.

## 2024-04-16 DIAGNOSIS — R53.83 OTHER FATIGUE: ICD-10-CM

## 2024-04-17 ENCOUNTER — TELEPHONE (OUTPATIENT)
Age: 59
End: 2024-04-17

## 2024-04-17 DIAGNOSIS — R53.83 OTHER FATIGUE: ICD-10-CM

## 2024-04-17 RX ORDER — CYANOCOBALAMIN 1000 UG/ML
1000 INJECTION, SOLUTION INTRAMUSCULAR; SUBCUTANEOUS
Qty: 1 ML | Refills: 2 | Status: SHIPPED | OUTPATIENT
Start: 2024-04-17 | End: 2024-04-19 | Stop reason: SDUPTHER

## 2024-04-17 RX ORDER — CYANOCOBALAMIN 1000 UG/ML
INJECTION, SOLUTION INTRAMUSCULAR; SUBCUTANEOUS
Qty: 3 ML | Refills: 0 | Status: SHIPPED | OUTPATIENT
Start: 2024-04-17 | End: 2024-05-29 | Stop reason: SDUPTHER

## 2024-04-17 NOTE — TELEPHONE ENCOUNTER
Medication reordered and faxed to [facility] pharmacy home delivery.  Recommended patient to follow-up with clinic prior to next refill.

## 2024-04-19 RX ORDER — CYANOCOBALAMIN 1000 UG/ML
1000 INJECTION, SOLUTION INTRAMUSCULAR; SUBCUTANEOUS
Qty: 3 ML | Refills: 0 | Status: SHIPPED | OUTPATIENT
Start: 2024-04-19 | End: 2024-06-19

## 2024-05-05 ENCOUNTER — HOSPITAL ENCOUNTER (EMERGENCY)
Age: 59
Discharge: HOME | End: 2024-05-05
Payer: COMMERCIAL

## 2024-05-05 VITALS
DIASTOLIC BLOOD PRESSURE: 79 MMHG | HEIGHT: 64 IN | TEMPERATURE: 98.5 F | BODY MASS INDEX: 38.24 KG/M2 | RESPIRATION RATE: 20 BRPM | SYSTOLIC BLOOD PRESSURE: 130 MMHG | HEART RATE: 86 BPM | WEIGHT: 224 LBS | OXYGEN SATURATION: 98 %

## 2024-05-05 DIAGNOSIS — S29.019A THORACIC MYOFASCIAL STRAIN, INITIAL ENCOUNTER: Primary | ICD-10-CM

## 2024-05-05 LAB
BILIRUB UR STRIP.AUTO-MCNC: NEGATIVE MG/DL
CHARACTER UR: CLEAR
COLOR: YELLOW
GLUCOSE UR QL STRIP.AUTO: NEGATIVE MG/DL
KETONES UR QL STRIP.AUTO: NEGATIVE
NITRITE UR QL STRIP.AUTO: NEGATIVE
PH UR STRIP.AUTO: 6 [PH] (ref 5–9)
PROT UR STRIP.AUTO-MCNC: NEGATIVE MG/DL
RBC #/AREA URNS HPF: NEGATIVE /[HPF]
SP GR UR STRIP.AUTO: <= 1.005 (ref 1–1.03)
UROBILINOGEN, URINE: 0.2 EU/DL (ref 0.2–1)
WBC #/AREA URNS HPF: ABNORMAL /[HPF]

## 2024-05-05 PROCEDURE — 6360000002 HC RX W HCPCS: Performed by: EMERGENCY MEDICINE

## 2024-05-05 PROCEDURE — 99213 OFFICE O/P EST LOW 20 MIN: CPT | Performed by: EMERGENCY MEDICINE

## 2024-05-05 PROCEDURE — 99213 OFFICE O/P EST LOW 20 MIN: CPT

## 2024-05-05 PROCEDURE — 81003 URINALYSIS AUTO W/O SCOPE: CPT

## 2024-05-05 PROCEDURE — 99214 OFFICE O/P EST MOD 30 MIN: CPT

## 2024-05-05 PROCEDURE — 96372 THER/PROPH/DIAG INJ SC/IM: CPT

## 2024-05-05 RX ORDER — ORPHENADRINE CITRATE 30 MG/ML
60 INJECTION INTRAMUSCULAR; INTRAVENOUS ONCE
Status: COMPLETED | OUTPATIENT
Start: 2024-05-05 | End: 2024-05-05

## 2024-05-05 RX ORDER — NAPROXEN 500 MG/1
500 TABLET ORAL 2 TIMES DAILY WITH MEALS
Qty: 60 TABLET | Refills: 0 | Status: SHIPPED | OUTPATIENT
Start: 2024-05-05

## 2024-05-05 RX ORDER — NAPROXEN 500 MG/1
500 TABLET ORAL 2 TIMES DAILY WITH MEALS
Qty: 60 TABLET | Refills: 0 | Status: SHIPPED | OUTPATIENT
Start: 2024-05-05 | End: 2024-05-05 | Stop reason: CLARIF

## 2024-05-05 RX ORDER — CYCLOBENZAPRINE HCL 10 MG
10 TABLET ORAL 3 TIMES DAILY PRN
Qty: 21 TABLET | Refills: 0 | Status: SHIPPED | OUTPATIENT
Start: 2024-05-05 | End: 2024-05-15

## 2024-05-05 RX ORDER — IBUPROFEN 800 MG/1
800 TABLET ORAL EVERY 6 HOURS PRN
COMMUNITY

## 2024-05-05 RX ORDER — CYCLOBENZAPRINE HCL 10 MG
10 TABLET ORAL 3 TIMES DAILY PRN
Qty: 21 TABLET | Refills: 0 | Status: SHIPPED | OUTPATIENT
Start: 2024-05-05 | End: 2024-05-05 | Stop reason: CLARIF

## 2024-05-05 RX ORDER — KETOROLAC TROMETHAMINE 30 MG/ML
30 INJECTION, SOLUTION INTRAMUSCULAR; INTRAVENOUS ONCE
Status: COMPLETED | OUTPATIENT
Start: 2024-05-05 | End: 2024-05-05

## 2024-05-05 RX ORDER — CETIRIZINE HYDROCHLORIDE 10 MG/1
10 TABLET ORAL DAILY
COMMUNITY

## 2024-05-05 RX ADMIN — KETOROLAC TROMETHAMINE 30 MG: 30 INJECTION, SOLUTION INTRAMUSCULAR at 17:23

## 2024-05-05 RX ADMIN — ORPHENADRINE CITRATE 60 MG: 60 INJECTION INTRAMUSCULAR; INTRAVENOUS at 17:23

## 2024-05-05 ASSESSMENT — PAIN SCALES - GENERAL
PAINLEVEL_OUTOF10: 6
PAINLEVEL_OUTOF10: 6

## 2024-05-05 ASSESSMENT — PAIN DESCRIPTION - LOCATION: LOCATION: BACK

## 2024-05-05 ASSESSMENT — PAIN DESCRIPTION - ORIENTATION: ORIENTATION: RIGHT

## 2024-05-05 ASSESSMENT — PAIN - FUNCTIONAL ASSESSMENT: PAIN_FUNCTIONAL_ASSESSMENT: 0-10

## 2024-05-05 NOTE — ED PROVIDER NOTES
symptoms of UTI.  Patient's pain is aggravated with movement.  She was given Toradol and Norflex at the urgent care and will be discharged with naproxen and cyclobenzaprine.  Advised to perform gentle stretches.  Follow-up with family physician if no improvement in 2 to 3 days.  Sooner if worse.      PATIENT REFERRED TO:  Paulette Browne APRN - CNP  [address] / [facility] [address]      DISCHARGE MEDICATIONS:  New Prescriptions    CYCLOBENZAPRINE (FLEXERIL) 10 MG TABLET    Take 1 tablet by mouth 3 times daily as needed for Muscle spasms    NAPROXEN (NAPROSYN) 500 MG TABLET    Take 1 tablet by mouth 2 times daily (with meals)       Discontinued Medications    CYANOCOBALAMIN 1000 MCG/ML INJECTION    Inject 1 mL into the muscle every 30 days for 3 doses       Current Discharge Medication List          JOJO Call CNP    (Please note that portions of this note were completed with a voice recognition program. Efforts were made to edit the dictations but occasionally words are mis-transcribed.)           Naga Nath, JOJO - CNP  05/05/24 5120

## 2024-05-05 NOTE — ED NOTES
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  She inquires about if this is something she should be off work for     Marimar Driscoll, BHUMI  05/05/24 [id number]

## 2024-05-05 NOTE — DISCHARGE INSTRUCTIONS
You may apply ice to the back several times per day for 10 minutes each time.  After 2 additional days you may switch to heating pad    Gentle stretches as tolerated    Naproxen, take with food    Cyclobenzaprine as a muscle relaxer.  Do not take if you are working, do not drive or drink alcohol after taking    Follow-up family physician return if no significant improvement in 3 days

## 2024-05-29 ENCOUNTER — OFFICE VISIT (OUTPATIENT)
Age: 59
End: 2024-05-29

## 2024-05-29 VITALS
WEIGHT: 224 LBS | HEART RATE: 84 BPM | BODY MASS INDEX: 38.24 KG/M2 | RESPIRATION RATE: 18 BRPM | DIASTOLIC BLOOD PRESSURE: 82 MMHG | HEIGHT: 64 IN | OXYGEN SATURATION: 98 % | SYSTOLIC BLOOD PRESSURE: 134 MMHG | TEMPERATURE: 98.1 F

## 2024-05-29 VITALS — SYSTOLIC BLOOD PRESSURE: 118 MMHG | DIASTOLIC BLOOD PRESSURE: 78 MMHG

## 2024-05-29 DIAGNOSIS — F41.9 MODERATE ANXIETY: ICD-10-CM

## 2024-05-29 DIAGNOSIS — K21.9 GASTROESOPHAGEAL REFLUX DISEASE WITHOUT ESOPHAGITIS: ICD-10-CM

## 2024-05-29 DIAGNOSIS — R00.0 TACHYCARDIA, UNSPECIFIED: ICD-10-CM

## 2024-05-29 DIAGNOSIS — Z00.8 ENCOUNTER FOR BIOMETRIC SCREENING: Primary | ICD-10-CM

## 2024-05-29 DIAGNOSIS — J30.2 SEASONAL ALLERGIES: ICD-10-CM

## 2024-05-29 DIAGNOSIS — E03.8 SUBCLINICAL HYPOTHYROIDISM: ICD-10-CM

## 2024-05-29 DIAGNOSIS — Z76.0 MEDICATION REFILL: Primary | ICD-10-CM

## 2024-05-29 DIAGNOSIS — E66.9 OBESITY WITH BODY MASS INDEX 30 OR GREATER: ICD-10-CM

## 2024-05-29 DIAGNOSIS — R53.83 OTHER FATIGUE: ICD-10-CM

## 2024-05-29 PROBLEM — M77.8 TENDINITIS OF RIGHT SHOULDER: Status: ACTIVE | Noted: 2021-01-26

## 2024-05-29 PROBLEM — K44.9 HIATAL HERNIA: Status: ACTIVE | Noted: 2018-03-15

## 2024-05-29 LAB
CHOLESTEROL: 0 MG/DL
CHP ED QC CHECK: ABNORMAL
GLUCOSE BLD-MCNC: 100 MG/DL
HIGH DENSITY CHOLESTEROL: 51 MG/DL
NICOTINE: NEGATIVE
TRIGL SERPL-MCNC: 135 MG/DL

## 2024-05-29 RX ORDER — LEVOTHYROXINE SODIUM 0.03 MG/1
25 TABLET ORAL DAILY
Qty: 90 TABLET | Refills: 0 | Status: SHIPPED | OUTPATIENT
Start: 2024-05-29 | End: 2024-05-29

## 2024-05-29 RX ORDER — CYANOCOBALAMIN 1000 UG/ML
INJECTION, SOLUTION INTRAMUSCULAR; SUBCUTANEOUS
Qty: 3 ML | Refills: 2 | Status: SHIPPED | OUTPATIENT
Start: 2024-05-29

## 2024-05-29 RX ORDER — CETIRIZINE HYDROCHLORIDE 10 MG/1
10 TABLET ORAL DAILY
Qty: 90 TABLET | Refills: 0 | Status: SHIPPED | OUTPATIENT
Start: 2024-05-29 | End: 2024-08-27

## 2024-05-29 RX ORDER — FLUTICASONE PROPIONATE 50 MCG
1 SPRAY, SUSPENSION (ML) NASAL DAILY
Qty: 16 G | Refills: 0 | Status: SHIPPED | OUTPATIENT
Start: 2024-05-29 | End: 2024-08-27

## 2024-05-29 RX ORDER — LEVOTHYROXINE SODIUM 0.05 MG/1
25 TABLET ORAL DAILY
Qty: 45 TABLET | Refills: 0 | Status: SHIPPED | OUTPATIENT
Start: 2024-05-29 | End: 2024-08-27

## 2024-05-29 ASSESSMENT — ENCOUNTER SYMPTOMS
SWOLLEN GLANDS: 0
SORE THROAT: 0
NAUSEA: 0
SHORTNESS OF BREATH: 0
VOMITING: 0
SINUS PRESSURE: 0
CHANGE IN BOWEL HABIT: 0
COUGH: 0
CHEST TIGHTNESS: 0
VISUAL CHANGE: 0
ABDOMINAL PAIN: 0

## 2024-05-29 NOTE — PROGRESS NOTES
Terrie Hernandez (:  1965) is a 59 y.o. female,Established patient, here for evaluation of the following chief complaint(s):  Medication Refill      Assessment & Plan   1. Medication refill  2. Tachycardia, unspecified  3. Gastroesophageal reflux disease without esophagitis  4. Subclinical hypothyroidism  -     levothyroxine (SYNTHROID) 25 MCG tablet; Take 1 tablet by mouth daily, Disp-90 tablet, R-0Print  5. Moderate anxiety  6. Other fatigue  -     cyanocobalamin 1000 MCG/ML injection; Inject 1 mL into the muscle every 30 days. Please schedule a follow up appointment and repeat blood draw before another refill request., Disp-3 mL, R-2Normal  7. Obesity with body mass index 30 or greater  8. Seasonal allergies  -     cetirizine (ZYRTEC) 10 MG tablet; Take 1 tablet by mouth daily, Disp-90 tablet, R-0Print  -     fluticasone (FLONASE) 50 MCG/ACT nasal spray; 1 spray by Nasal route daily, Disp-16 g, R-0Print      Return if symptoms worsen or fail to improve.     59-year-old female presents to clinic today for medication refill.  Patient wishes for me to be primary care.  Discussed all diagnoses and medications at length with patient prior to discharge answered all questions to patient's satisfaction.  Recommended patient to follow-up as needed.  Subjective   Medication Refill  This is a recurrent problem. The current episode started today. Pertinent negatives include no abdominal pain, anorexia, arthralgias, change in bowel habit, chest pain, chills, congestion, coughing, diaphoresis, fatigue, fever, headaches, joint swelling, myalgias, nausea, neck pain, numbness, rash, sore throat, swollen glands, urinary symptoms, vertigo, visual change, vomiting or weakness.       Review of Systems   Constitutional:  Negative for activity change, appetite change, chills, diaphoresis, fatigue and fever.   HENT:  Negative for congestion, sinus pressure and sore throat.    Eyes:  Negative for visual disturbance.   Respiratory:

## 2025-05-23 ENCOUNTER — OFFICE VISIT (OUTPATIENT)
Age: 60
End: 2025-05-23

## 2025-05-23 VITALS — SYSTOLIC BLOOD PRESSURE: 128 MMHG | DIASTOLIC BLOOD PRESSURE: 82 MMHG

## 2025-05-23 DIAGNOSIS — Z00.00 ENCOUNTER FOR WELLNESS EXAMINATION: Primary | ICD-10-CM

## 2025-05-23 LAB
CHOLESTEROL/HDL RATIO: NORMAL
CHP ED QC CHECK: NORMAL
GLUCOSE BLD-MCNC: 99 MG/DL
HDLC SERPL-MCNC: 55 MG/DL (ref 35–70)
LDL CHOLESTEROL: NORMAL
NICOTINE: NEGATIVE
SUM TOTAL CHOLESTEROL: NORMAL
TRIGL SERPL-MCNC: 128 MG/DL
VLDLC SERPL CALC-MCNC: NORMAL MG/DL

## (undated) DEVICE — ROYAL SILK SURGICAL GOWN, XXL: Brand: CONVERTORS

## (undated) DEVICE — EVOS 3.5MM X 34MM LOCKING SCREW SELF-TAPPING
Type: IMPLANTABLE DEVICE | Site: ARM | Status: NON-FUNCTIONAL
Brand: EVOS
Removed: 2021-10-28

## (undated) DEVICE — DRAPE,U/SHT,SPLIT,FILM,60X84,STERILE: Brand: MEDLINE

## (undated) DEVICE — PACK PROCEDURE SURG SET UP SRMC

## (undated) DEVICE — BANDAGE COMPR E SGL LAYERED CLSR BGE W/ CLP W4INXL15FT

## (undated) DEVICE — PENCIL SMK EVAC ALL IN 1 DSGN ENH VISIBILITY IMPROVED AIR

## (undated) DEVICE — GAUZE,SPONGE,8"X4",12PLY,XRAY,STRL,LF: Brand: MEDLINE

## (undated) DEVICE — EVOS SMALL 2.5MM DRILL W/AO QC LONG: Brand: EVOS

## (undated) DEVICE — 450 ML BOTTLE OF 0.05% CHLORHEXIDINE GLUCONATE IN 99.95% STERILE WATER FOR IRRIGATION, USP AND APPLICATOR.: Brand: IRRISEPT ANTIMICROBIAL WOUND LAVAGE

## (undated) DEVICE — GLOVE ORANGE PI 8 1/2   MSG9085

## (undated) DEVICE — GLOVE SURG SZ 9 THK91MIL LTX FREE SYN POLYISOPRENE ANTI

## (undated) DEVICE — C-ARM: Brand: UNBRANDED

## (undated) DEVICE — BAG,BANDED,W/RUBBERBAND,STERILE,30X36: Brand: MEDLINE

## (undated) DEVICE — DRAPE,HIP,W/POUCHES,STERILE: Brand: MEDLINE

## (undated) DEVICE — SYSTEM SKIN CLSR 22CM DERMBND PRINEO

## (undated) DEVICE — PACK-MAJOR

## (undated) DEVICE — APPLICATOR MEDICATED 26 CC SOLUTION HI LT ORNG CHLORAPREP

## (undated) DEVICE — EVOS 3.5MM X 36MM LOCKING SCREW SELF-TAPPING
Type: IMPLANTABLE DEVICE | Site: ARM | Status: NON-FUNCTIONAL
Brand: EVOS
Removed: 2021-10-28

## (undated) DEVICE — BASIC SINGLE BASIN BTC-LF: Brand: MEDLINE INDUSTRIES, INC.

## (undated) DEVICE — PACK PROCEDURE SURG ORTH BASIC SRHP LF

## (undated) DEVICE — EVOS 3.5MM X 20MM CORTEX SCREW SELF-TAPPING
Type: IMPLANTABLE DEVICE | Site: ARM | Status: NON-FUNCTIONAL
Brand: EVOS
Removed: 2021-10-28